# Patient Record
Sex: MALE | Race: BLACK OR AFRICAN AMERICAN | NOT HISPANIC OR LATINO | Employment: UNEMPLOYED | URBAN - METROPOLITAN AREA
[De-identification: names, ages, dates, MRNs, and addresses within clinical notes are randomized per-mention and may not be internally consistent; named-entity substitution may affect disease eponyms.]

---

## 2017-06-22 ENCOUNTER — HISTORICAL (OUTPATIENT)
Dept: ADMINISTRATIVE | Facility: HOSPITAL | Age: 57
End: 2017-06-22

## 2017-07-07 ENCOUNTER — HISTORICAL (OUTPATIENT)
Dept: RADIOLOGY | Facility: HOSPITAL | Age: 57
End: 2017-07-07

## 2017-07-07 LAB
CHOLEST SERPL-MCNC: 192 MG/DL
CHOLEST/HDLC SERPL: 2.9 {RATIO} (ref 0–5)
EST. AVERAGE GLUCOSE BLD GHB EST-MCNC: 123 MG/DL
HAV IGM SERPL QL IA: NONREACTIVE
HBA1C MFR BLD: 5.9 % (ref 4.2–6.3)
HBV CORE IGM SERPL QL IA: NONREACTIVE
HBV SURFACE AG SERPL QL IA: NEGATIVE
HCV AB SERPL QL IA: REACTIVE
HDLC SERPL-MCNC: 66 MG/DL
HIV 1+2 AB+HIV1 P24 AG SERPL QL IA: NONREACTIVE
LDLC SERPL CALC-MCNC: 106 MG/DL (ref 0–130)
PSA SERPL-MCNC: 0.2 NG/ML
TRIGL SERPL-MCNC: 101 MG/DL
TSH SERPL-ACNC: 0.58 MIU/L (ref 0.36–3.74)
VLDLC SERPL CALC-MCNC: 20 MG/DL

## 2017-07-09 LAB
COLOR STL: NORMAL
CONSISTENCY STL: NORMAL
HEMOCCULT SP1 STL QL: NEGATIVE

## 2017-07-10 LAB
COLOR STL: NORMAL
CONSISTENCY STL: NORMAL
HEMOCCULT SP2 STL QL: NEGATIVE

## 2017-07-11 LAB
COLOR STL: NORMAL
CONSISTENCY STL: NORMAL

## 2017-07-20 ENCOUNTER — HISTORICAL (OUTPATIENT)
Dept: ADMINISTRATIVE | Facility: HOSPITAL | Age: 57
End: 2017-07-20

## 2017-07-20 LAB
BUN SERPL-MCNC: 13 MG/DL (ref 7–18)
CALCIUM SERPL-MCNC: 9.3 MG/DL (ref 8.5–10.1)
CHLORIDE SERPL-SCNC: 105 MMOL/L (ref 98–107)
CO2 SERPL-SCNC: 29 MMOL/L (ref 21–32)
CREAT SERPL-MCNC: 0.9 MG/DL (ref 0.6–1.3)
ERYTHROCYTE [DISTWIDTH] IN BLOOD BY AUTOMATED COUNT: 12.8 % (ref 11.5–14.5)
GLUCOSE SERPL-MCNC: 111 MG/DL (ref 74–106)
HCT VFR BLD AUTO: 44.5 % (ref 40–51)
HGB BLD-MCNC: 15 GM/DL (ref 13.5–17.5)
INR PPP: 0.93 (ref 0.9–1.2)
MCH RBC QN AUTO: 30.7 PG (ref 26–34)
MCHC RBC AUTO-ENTMCNC: 33.7 GM/DL (ref 31–37)
MCV RBC AUTO: 91.2 FL (ref 80–100)
PLATELET # BLD AUTO: 220 X10(3)/MCL (ref 130–400)
PMV BLD AUTO: 10.8 FL (ref 7.4–10.4)
POTASSIUM SERPL-SCNC: 4.1 MMOL/L (ref 3.5–5.1)
PROTHROMBIN TIME: 12.3 SECOND(S) (ref 11.9–14.4)
RBC # BLD AUTO: 4.88 X10(6)/MCL (ref 4.5–5.9)
RPR SER QL: NON REACTIVE
SODIUM SERPL-SCNC: 143 MMOL/L (ref 136–145)
WBC # SPEC AUTO: 8.4 X10(3)/MCL (ref 4.5–11)

## 2017-07-31 ENCOUNTER — HISTORICAL (OUTPATIENT)
Dept: CARDIOLOGY | Facility: HOSPITAL | Age: 57
End: 2017-07-31

## 2017-08-15 ENCOUNTER — HISTORICAL (OUTPATIENT)
Dept: RADIOLOGY | Facility: HOSPITAL | Age: 57
End: 2017-08-15

## 2017-08-30 ENCOUNTER — HISTORICAL (OUTPATIENT)
Dept: CARDIOLOGY | Facility: CLINIC | Age: 57
End: 2017-08-30

## 2017-08-30 LAB
APTT PPP: 28.3 SECOND(S) (ref 23.3–37)
BUN SERPL-MCNC: 14 MG/DL (ref 7–18)
CALCIUM SERPL-MCNC: 9.2 MG/DL (ref 8.5–10.1)
CHLORIDE SERPL-SCNC: 106 MMOL/L (ref 98–107)
CO2 SERPL-SCNC: 25 MMOL/L (ref 21–32)
CREAT SERPL-MCNC: 1 MG/DL (ref 0.6–1.3)
ERYTHROCYTE [DISTWIDTH] IN BLOOD BY AUTOMATED COUNT: 12.8 % (ref 11.5–14.5)
GLUCOSE SERPL-MCNC: 229 MG/DL (ref 74–106)
HCT VFR BLD AUTO: 46.6 % (ref 40–51)
HGB BLD-MCNC: 15.8 GM/DL (ref 13.5–17.5)
INR PPP: 0.84 (ref 0.9–1.2)
MCH RBC QN AUTO: 30.8 PG (ref 26–34)
MCHC RBC AUTO-ENTMCNC: 33.9 GM/DL (ref 31–37)
MCV RBC AUTO: 90.8 FL (ref 80–100)
PLATELET # BLD AUTO: 219 X10(3)/MCL (ref 130–400)
PMV BLD AUTO: 10.2 FL (ref 7.4–10.4)
POTASSIUM SERPL-SCNC: 4.7 MMOL/L (ref 3.5–5.1)
PROTHROMBIN TIME: 11.3 SECOND(S) (ref 11.9–14.4)
RBC # BLD AUTO: 5.13 X10(6)/MCL (ref 4.5–5.9)
SODIUM SERPL-SCNC: 139 MMOL/L (ref 136–145)
WBC # SPEC AUTO: 7.4 X10(3)/MCL (ref 4.5–11)

## 2017-09-06 ENCOUNTER — HISTORICAL (OUTPATIENT)
Dept: CARDIOLOGY | Facility: HOSPITAL | Age: 57
End: 2017-09-06

## 2018-06-11 ENCOUNTER — HISTORICAL (OUTPATIENT)
Dept: INTERNAL MEDICINE | Facility: CLINIC | Age: 58
End: 2018-06-11

## 2018-06-29 ENCOUNTER — HISTORICAL (OUTPATIENT)
Dept: ADMINISTRATIVE | Facility: HOSPITAL | Age: 58
End: 2018-06-29

## 2018-07-25 ENCOUNTER — HISTORICAL (OUTPATIENT)
Dept: RADIOLOGY | Facility: HOSPITAL | Age: 58
End: 2018-07-25

## 2018-10-11 ENCOUNTER — HISTORICAL (OUTPATIENT)
Dept: ADMINISTRATIVE | Facility: HOSPITAL | Age: 58
End: 2018-10-11

## 2018-11-08 ENCOUNTER — HISTORICAL (OUTPATIENT)
Dept: ADMINISTRATIVE | Facility: HOSPITAL | Age: 58
End: 2018-11-08

## 2018-12-06 ENCOUNTER — HISTORICAL (OUTPATIENT)
Dept: LAB | Facility: HOSPITAL | Age: 58
End: 2018-12-06

## 2019-01-08 ENCOUNTER — HOSPITAL ENCOUNTER (OUTPATIENT)
Dept: MEDSURG UNIT | Facility: HOSPITAL | Age: 59
End: 2019-01-09
Attending: INTERNAL MEDICINE | Admitting: INTERNAL MEDICINE

## 2019-02-06 ENCOUNTER — HISTORICAL (OUTPATIENT)
Dept: ADMINISTRATIVE | Facility: HOSPITAL | Age: 59
End: 2019-02-06

## 2019-02-08 LAB — FINAL CULTURE: NO GROWTH

## 2019-02-14 ENCOUNTER — HISTORICAL (OUTPATIENT)
Dept: RADIOLOGY | Facility: HOSPITAL | Age: 59
End: 2019-02-14

## 2019-07-30 ENCOUNTER — HISTORICAL (OUTPATIENT)
Dept: INTERNAL MEDICINE | Facility: CLINIC | Age: 59
End: 2019-07-30

## 2019-08-22 ENCOUNTER — HISTORICAL (OUTPATIENT)
Dept: INTERNAL MEDICINE | Facility: CLINIC | Age: 59
End: 2019-08-22

## 2019-10-01 ENCOUNTER — HISTORICAL (OUTPATIENT)
Dept: RADIOLOGY | Facility: HOSPITAL | Age: 59
End: 2019-10-01

## 2019-10-23 ENCOUNTER — HISTORICAL (OUTPATIENT)
Dept: RADIOLOGY | Facility: HOSPITAL | Age: 59
End: 2019-10-23

## 2020-01-29 ENCOUNTER — HISTORICAL (OUTPATIENT)
Dept: INTERNAL MEDICINE | Facility: CLINIC | Age: 60
End: 2020-01-29

## 2020-05-28 ENCOUNTER — HISTORICAL (OUTPATIENT)
Dept: CARDIOLOGY | Facility: HOSPITAL | Age: 60
End: 2020-05-28

## 2021-12-03 ENCOUNTER — HISTORICAL (OUTPATIENT)
Dept: RADIOLOGY | Facility: HOSPITAL | Age: 61
End: 2021-12-03

## 2022-04-09 ENCOUNTER — HISTORICAL (OUTPATIENT)
Dept: ADMINISTRATIVE | Facility: HOSPITAL | Age: 62
End: 2022-04-09

## 2022-04-25 VITALS
DIASTOLIC BLOOD PRESSURE: 79 MMHG | HEIGHT: 68 IN | SYSTOLIC BLOOD PRESSURE: 129 MMHG | OXYGEN SATURATION: 99 % | WEIGHT: 162.69 LBS | BODY MASS INDEX: 24.66 KG/M2

## 2022-04-30 NOTE — DISCHARGE SUMMARY
Patient:   Palmer Quinones             MRN: [id number]            FIN: [id number]               Age:   58 years     Sex:  Male     :  1960   Associated Diagnoses:   Angina pectoris, unstable; HTN (hypertension)   Author:   Santi Ngo MD      Discharge Information      Discharge Summary Information   Admitted  2019   Discharged  2019   Admitting physician     Sarah Tate MD     Referring physician for admission     Gaurav Mcgovern MD     Discharge diagnosis     Angina pectoris, unstable ([id number] I20.0).     HTN (hypertension) ([id number] I10).     Discharge medications     OTHER MEDICATIONS (Selected)   Prescriptions  Prescribed  aspirin 81 mg oral tablet: 81 mg = 1 tab(s), Oral, Daily, # 90 tab(s), 1 Refill(s)  atorvastatin 20 mg oral tablet: 20 mg = 1 tab(s), Oral, Daily, # 30 tab(s), 3 Refill(s)  metoprolol tartrate 25 mg oral tab: 12.5 mg = 0.5 tab(s), Oral, BID, # 30 tab(s), 3 Refill(s).        Hospital Course   Mr. 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  He stated that the pain fluctuated in intensity with no radiation at rest and has not noticed it worsening on exertion.  Nothing makes better or worse.  Last episode in the morning of presentation to ED, but has resolved on exam in ED.  Patient admitted acid reflux symptoms, but does not take PPI.  He stated that he is unsure of whether these chest pains feel the same.  He is also unsure of whether the pain is associated with meals.  Patient denies any palpitations, shortness of breath, dizziness, loss of consciousness, fatigue, and weakness.  Patient was last seen in Cardiology clinic on 10/11/2018 and was asymptomatic at the time, but recommended repeat TTE in 1 year and continue to follow-up with Cardiology.  Duration of hospital stay was one night.  The patient  remained asymptomatic with no complaints of chest pain, palpitations, or any concerning symptoms since admission last night.  Patient was eating and ambulating without any issues.  This morning, patient was seen ambulating, drinking, coffee, and happily conversing with the nurse.  Patient even ambulated downstairs without respiratory distress, dizziness chest pain.  Patient received a TTE which showed LVEF of 65% and indications of grade 1 diastolic dysfunction with moderate MR, mild AR, and mild TR.  The planned cardiac stress test this morning was delayed due to the patient eating this morning.  However, due to patient asymptomatic, the cardiac stress test can be rescheduled as outpatient.  Patient was hemodynamically stable and without concerning complaints.  Patient was discharged home on cardiac diet and on self-care.  Because patient has a new right bundle branch block found on EKG, he was instructed to follow-up with Cardiology within 1 week.  He is to also keep his appointment for a dobutamine stress echo and follow up with his PCP for post-courtney visit.  He was resumed on home medications including aspirin, metoprolol tartrate, and a statin.  Due to the patient's increased 10 year ASCVD risk, he was started on a atorvastatin.  Discharge plans were discussed with the patient.  Patient stated understanding and agreed with the plan.      Discharge Plan   Discharge Summary Plan   Discharge Status: improved.     Discharge instructions given: to patient, to family member spouse.     Discharge disposition: discharge to home (into the care of family member, self care).     Prescriptions: continue same medications, reviewed (with patient, with spouse), written and given to patient.     Course   Improving.     Education and Follow-up   Counseled: patient, family, regarding diagnosis, regarding treatment, regarding medications.     Discharge Planning: Exercise Stress Echocardiogram, Angina Pectoris, Easy-to-Read, Follow  up with PCP for post-courtney visit within 2 weeks; Follow up with Cardiology within 1 week; Anytime the conditions worsen, return to clinic or go to ED; Keep appointment for cardiac stress test.

## 2022-04-30 NOTE — ED PROVIDER NOTES
Patient:   Palmer Quinones             MRN: [id number]            FIN: [id number]               Age:   58 years     Sex:  Male     :  1960   Associated Diagnoses:   Chest pain   Author:   Gaurav Mcgovern MD      Basic Information   Time seen: Date & time 2019 15:53:00.   History source: Patient.   Arrival mode: Private vehicle.   History limitation: None.   Additional information: Chief Complaint from Nursing Triage Note : Chief Complaint   2019 15:33 CST       Chief Complaint           c/o midsternal chest pain at intervals. also c/o left foot pain. 2nd toe swelling noted. hx of open heart surgery  .      History of Present Illness   The patient presents with right, toe pain.  The onset was chronic.  The course/duration of symptoms is constant.  Type of injury: none.  The character of symptoms is pain and swelling.  The degree at present is none.  There are exacerbating factors including movement and transfer.  The relieving factor is none.  Risk factors consist of age.  Prior episodes: none.  Therapy today: none.  Associated symptoms: none.     The patient presents with chest pain.  The onset was 1  weeks ago.  The course/duration of symptoms is fluctuating in intensity.  Location: substernal. Radiating pain: none. The character of symptoms is tightness.  The degree at onset was moderate.  The degree at maximum was severe.  The degree at present is none.  The exacerbating factor is exertion.  The relieving factor is rest.  Risk factors consist of hypertension and hx of left atrial myxoma.  Prior episodes: myxoma.  Therapy today None.  Associated symptoms: none.        Review of Systems   Constitutional symptoms:  No fever, no chills, no sweats, no weakness, no fatigue.    Skin symptoms:  No rash,    Eye symptoms:  No recent vision problems,    ENMT symptoms:  No sore throat,    Respiratory symptoms:  Negative except as documented in HPI.   Cardiovascular symptoms:  Negative except as  documented in HPI.   Gastrointestinal symptoms:  No abdominal pain, no nausea, no vomiting, no diarrhea, no constipation.    Genitourinary symptoms:  No dysuria,    Musculoskeletal symptoms:  No back pain, no Muscle pain.    Neurologic symptoms:  No headache, no dizziness.              Additional review of systems information: All other systems reviewed and otherwise negative.      Health Status   Allergies:    Allergic Reactions (Selected)  No Known Medication Allergies.   Medications:  (Selected)   Prescriptions  Prescribed  aspirin 81 mg oral tablet: 81 mg = 1 tab(s), Oral, Daily, # 90 tab(s), 0 Refill(s), other reason (Rx)  losartan 25 mg oral tablet: 25 mg = 1 tab(s), Oral, Daily, # 30 tab(s), 11 Refill(s), Pharmacy: [id number]  metoprolol tartrate 25 mg oral tab: 12.5 mg = 0.5 tab(s), Oral, BID, # 30 tab(s), 11 Refill(s), Pharmacy: [id number].      Past Medical/ Family/ Social History   Surgical history:    left atrial myxoma resection on 10/5/2017 at 57 Years.  Transesophageal Echo (for [facility]) (None) on 7/31/2017 at 57 Years.  Comments:  7/31/2017 09:07 - Dakotah BLANCO, Mami WAHL  auto-populated from documented surgical case  denies.  open heart surgery..   Family history:    Heart attack  Mother  Hypertension.  Father  Diabetes mellitus type 1.  Father  .      Physical Examination   General:  Alert, no acute distress.                Vital Signs   Skin:  Warm, dry.    Head:  Normocephalic.   Neck:  Supple, trachea midline, no tenderness, no JVD.    Eye:  Pupils are equal, round and reactive to light, extraocular movements are intact.    Ears, nose, mouth and throat:  Oral mucosa moist, no pharyngeal erythema or exudate.    Cardiovascular:  Regular rate and rhythm, No murmur, Normal peripheral perfusion, No edema.    Respiratory:  Lungs are clear to auscultation, respirations are non-labored, breath sounds are equal, Symmetrical chest wall expansion.    Chest wall:  No tenderness.   Back:   Nontender, Normal range of motion, Normal alignment, no step-offs.    Musculoskeletal:  Normal ROM, normal strength, no tenderness, no swelling, no deformity.    Gastrointestinal:  Soft, Nontender, Non distended, Normal bowel sounds.    Neurological:  Alert and oriented to person, place, time, and situation, No focal neurological deficit observed, CN II-XII intact, normal sensory observed, normal motor observed, normal speech observed, normal coordination observed.       Medical Decision Making   Documents reviewed:  Emergency department nurses' notes, emergency department records, prior records.    Electrocardiogram:  Time 1/8/2019 15:31:00, rate 88, normal sinus rhythm, No ST-T changes, EP Interp, QRS interval Left ventricular hypertrophy, right bundle branch block.    Results review:  Lab results : Lab View   1/8/2019 16:04 CST       Sodium Lvl                140 mmol/L                             Potassium Lvl             3.7 mmol/L                             Chloride                  106 mmol/L                             CO2                       28 mmol/L                             Calcium Lvl               8.5 mg/dL                             Glucose Lvl               176 mg/dL  HI                             BUN                       16 mg/dL                             Creatinine                1.00 mg/dL                             eGFR-AA                   99 mL/min                             eGFR-JAZMYN                  82 mL/min  LOW                             Bili Total                0.3 mg/dL                             Bili Direct               0.1 mg/dL                             Bili Indirect             0.2 mg/dL                             AST                       23 unit/L                             ALT                       22 unit/L                             Alk Phos                  63 unit/L                             Total Protein             7.6 gm/dL                              Albumin Lvl               3.7 gm/dL                             Globulin                  3.90 gm/mL  HI                             A/G Ratio                 1 ratio                             NT pro BNP.               62 pg/mL                             Total CK                  350 unit/L  HI                             CK MB                     2.7 ng/mL                             Troponin-I                <0.015 ng/mL                             PT                        14.1 second(s)                             INR                       1.10                             PTT                       28.0 second(s)                             WBC                       8.5 x10(3)/mcL                             RBC                       4.30 x10(6)/mcL  LOW                             Hgb                       13.1 gm/dL  LOW                             Hct                       38.6 %  LOW                             Platelet                  201 x10(3)/mcL                             MCV                       89.8 fL                             MCH                       30.5 pg                             MCHC                      33.9 gm/dL                             RDW                       12.7 %                             MPV                       9.3 fL                             Abs Neut                  5.50 x10(3)/mcL                             Neutro Auto               65 x10(3)/mcL  NA                             Lymph Auto                26 %                             Mono Auto                 7 %                             Eos Auto                  1  NA                             Abs Eos                   0.08 x10(3)/mcL  NA                             Basophil Auto             0  NA                             Abs Neutro                5.50 x10(3)/mcL  NA                             Abs Lymph                 2.20 x10(3)/mcL  NA                             Abs Mono                  0.63  x10(3)/mcL  NA                             Abs Baso                  0.03 x10(3)/mcL  NA                             IG%                       0 %  NA                             IG#                       0.0200  NA  .   Radiology results:  Rad Results (ST)  < 12 hrs   Accession: [id number]  Order: XR Chest 2 Views  Report Dt/Tm: 01/08/2019 16:42  Report:   Chest 2 views     Clinical history is chest pain     This is compared to a previous study from 5/24/2017     Lungs are clear. Heart size is within normal limits. The costophrenic  angles are clear.     IMPRESSION: No acute disease is seen.    .      Reexamination/ Reevaluation   Vital signs   results included from flowsheet : Vital Signs   1/8/2019 15:33 CST       Temperature Oral          37 DegC                             Temperature Oral (calculated)             98.60 DegF                             Peripheral Pulse Rate     90 bpm                             Respiratory Rate          20 br/min                             SpO2                      98 %                             Oxygen Therapy            Room air                             Systolic Blood Pressure   142 mmHg  HI                             Diastolic Blood Pressure  90 mmHg        Impression and Plan   Diagnosis   Chest pain (ERN58-OL R07.9)      Calls-Consults   -  1/8/2019 17:36:00 , Shaikh HATTIE, SANJANA Montano on call, consult.    Plan   Condition: Guarded.    Disposition: Admit time  1/8/2019 16:57:00, Place in Observation Telemetry Unit.

## 2022-05-02 NOTE — HISTORICAL [facility] CERNER
This is a historical note converted from [employer]. Formatting and pictures may have been removed.  Please reference [employer] for original formatting and attached multimedia. Chief Complaint  ER F/U- states he has chest and stomach pain off and on headaches dry mouth rt leg pain  History of Present Illness  56 y/o male here for initial visit. Pt was seen in ER 5-24-17 for CP and had abnl EKG. Pt states he get CP off and on approx 3 X per week to mid right upper CW. Denies SOB. Mom passed with massive MI, Dad has hx CAD. Pt states his blood sugar was elevated in ER and has fmly hx of DM in mom. Pt states he also has constipation off and on at times. Pt states he has been having right leg pain at knee area X 2 months. Denies injury to knee.  Review of Systems  All negative except: See HPI  Physical Exam  Vitals & Measurements  T:?36.7? ?C ?(Oral)? HR:?55?(Peripheral)? BP:?135/91? HT:?170?cm? HT:?170?cm? WT:?69?kg? WT:?69?kg? BMI:?23.88?  General: Alert and oriented, No acute distress.  Eye: Pupils are equal, round and reactive to light, Extraocular movements are intact.  HENT: Normocephalic.  Neck: Supple, Non-tender, No carotid bruit, No lymphadenopathy.  Respiratory: Lungs are clear to auscultation, Respirations are non-labored, Breath sounds are equal, Symmetrical chest wall expansion.  Cardiovascular: Normal rate, Regular rhythm, No murmur.  Gastrointestinal: Soft, Non-tender, Non-distended, Normal bowel sounds.  Musculoskeletal: Normal range of motion. No swelling or redness noted to right knee. + grinding noted with AROM. No TTP.  Integumentary: Warm, Dry, Intact.  Neurologic: No focal deficits, Cranial Nerves II-XII are grossly intact.  Assessment/Plan  Chest pain  ?Denies at present. Pt had Abnl EKG in ER on 5-24-17. Refer to Cardio cl for eval and mgmt. Encouraged to start taking ASA 81 mg 1 tab po daily.  Ordered:  Clinic Follow up, *Est. 09/25/17 8:40:00 CDT, in 3 months with JOSUE Sarabia, Order for future visit,  Elevated blood sugar, [facility]  Echocardiogram Stress, *Est. 07/06/17 3:00:00 CDT, Routine, Cardiovascular DZ-Unspecified, *Est. Stop date 07/06/17 3:00:00 CDT, Ambulatory, [facility], Order for future visit, Chest pain  Family history of MI (myocardial infarction)  Internal Referral to Cardiology ([address]), CP, abnl EKG, fmly history of MI, *Est. 07/13/17 3:00:00 CDT, Future Visit?, Chest pain  Family history of MI (myocardial infarction)  Lipid Panel, Routine collect, *Est. 07/06/17 3:00:00 CDT, Blood, Order for future visit, *Est. Stop date 07/06/17 3:00:00 CDT, Lab Collect, Chest pain  Well adult exam, 2, week(s), In Approximately, 06/22/17 10:36:00 CDT  Office/Outpatient Visit Level 4 Established [id number] PC, Chest pain  Constipation  Elevated blood sugar  Well adult exam  Family history of MI (myocardial infarction), [facility], 06/22/17 10:53:00 CDT  ?  Constipation  ?Encouraged to drink plenty of water, encouraged high fiber diet. XR abd flat and erect today.  Ordered:  Clinic Follow up, *Est. 09/25/17 8:40:00 CDT, in 3 months with JOSUE Sarabia, Order for future visit, Elevated blood sugar, [facility]  Office/Outpatient Visit Level 4 Established [id number] PC, Chest pain  Constipation  Elevated blood sugar  Well adult exam  Family history of MI (myocardial infarction), [facility], 06/22/17 10:53:00 CDT  XR Abdomen Flat and Erect, Routine, *Est. 06/22/17 3:00:00 CDT, Constipation, None, Ambulatory, Rad Type, Order for future visit, Constipation, On Exactly, *Est. 06/22/17 3:00:00 CDT  ?  Elevated blood sugar  ?Encouraged ADA diet and exercise. A1c in 2 weeks.  Ordered:  Clinic Follow up, *Est. 09/25/17 8:40:00 CDT, in 3 months with JOSUE Sarabia, Order for future visit, Elevated blood sugar, [facility]  Hemoglobin A1C [facility], Routine collect, *Est. 07/06/17 3:00:00 CDT, Blood, Order for future visit, *Est. Stop date 07/06/17 3:00:00 CDT, Lab Collect, Elevated blood sugar, 2,  week(s), In Approximately, 06/22/17 10:36:00 CDT  Office/Outpatient Visit Level 4 Established [id number] PC, Chest pain  Constipation  Elevated blood sugar  Well adult exam  Family history of MI (myocardial infarction), [facility], 06/22/17 10:53:00 CDT  ?  Family history of MI (myocardial infarction)  ?Stress Echo in 2 weeks. Refer to Cardio cl for eval and mgmt.  Ordered:  Echocardiogram Stress, *Est. 07/06/17 3:00:00 CDT, Routine, Cardiovascular DZ-Unspecified, *Est. Stop date 07/06/17 3:00:00 CDT, Ambulatory, [facility], Order for future visit, Chest pain  Family history of MI (myocardial infarction)  Internal Referral to Cardiology ([address]), CP, abnl EKG, fmly history of MI, *Est. 07/13/17 3:00:00 CDT, Future Visit?, Chest pain  Family history of MI (myocardial infarction)  Office/Outpatient Visit Level 4 Established [id number] PC, Chest pain  Constipation  Elevated blood sugar  Well adult exam  Family history of MI (myocardial infarction), [facility], 06/22/17 10:53:00 CDT  ?  Right knee pain  ?XR right knee today. cont Aleve OTC as prescribed per package prn?pain.  Ordered:  XR Knee Right 4 or More Views, Routine, *Est. 06/22/17 3:00:00 CDT, Pain, None, Ambulatory, Rad Type, Order for future visit, Right knee pain, On Exactly, *Est. 06/22/17 3:00:00 CDT  ?  Well adult exam  ?Labs in 2 weeks. FOBT X 3 NA. pt refused tetanus vaccine.  Ordered:  Clinic Follow up, *Est. 09/25/17 8:40:00 CDT, in 3 months with JOSUE Sarabia, Order for future visit, Elevated blood sugar, [facility]  Hepatitis Panel [facility], Routine collect, *Est. 07/06/17 3:00:00 CDT, Blood, Order for future visit, *Est. Stop date 07/06/17 3:00:00 CDT, Lab Collect, Well adult exam, 2, week(s), In Approximately, 06/22/17 10:51:00 CDT  HIV 1 and 2, Routine collect, *Est. 07/06/17 3:00:00 CDT, Blood, Order for future visit, *Est. Stop date 07/06/17 3:00:00 CDT, Lab Collect, Well adult exam, 2, week(s), In Approximately,  06/22/17 10:51:00 CDT  Lipid Panel, Routine collect, *Est. 07/06/17 3:00:00 CDT, Blood, Order for future visit, *Est. Stop date 07/06/17 3:00:00 CDT, Lab Collect, Chest pain  Well adult exam, 2, week(s), In Approximately, 06/22/17 10:36:00 CDT  Occult Blood Stool #2 Screening, Routine collect, *Est. 07/22/17 3:00:00 CDT, Stool, Order for future visit, *Est. Stop date 07/22/17 3:00:00 CDT, Nurse collect, Well adult exam, day(s), 1, month(s), In Approximately, 06/22/17 10:37:00 CDT  Occult Blood Stool #3 Screening, Routine collect, *Est. 07/22/17 3:00:00 CDT, Stool, Order for future visit, *Est. Stop date 07/22/17 3:00:00 CDT, Nurse collect, Well adult exam, day(s), 1, month(s), In Approximately, 06/22/17 10:37:00 CDT  Occult Blood Stool Screening Test, Routine collect, *Est. 07/22/17 3:00:00 CDT, Stool, Order for future visit, *Est. Stop date 07/22/17 3:00:00 CDT, Nurse collect, Well adult exam, day(s), 1, month(s), In Approximately, 06/22/17 10:37:00 CDT  Office/Outpatient Visit Level 4 Established [id number] PC, Chest pain  Constipation  Elevated blood sugar  Well adult exam  Family history of MI (myocardial infarction), [facility], 06/22/17 10:53:00 CDT  Prostatic Specific Antigen Screening Test, Routine collect, *Est. 07/06/17 3:00:00 CDT, Blood, Order for future visit, *Est. Stop date 07/06/17 3:00:00 CDT, Lab Collect, Well adult exam, 2, week(s), In Approximately, 06/22/17 10:37:00 CDT  Thyroid Stimulating Hormone, Routine collect, *Est. 07/06/17 3:00:00 CDT, Blood, Order for future visit, *Est. Stop date 07/06/17 3:00:00 CDT, Lab Collect, Well adult exam, 2, week(s), In Approximately, 06/22/17 10:36:00 CDT  ?  Orders:  aspirin, 81 mg = 1 tab(s), Oral, Daily, # 90 tab(s), 0 Refill(s), other reason (Rx)  RTC in 3 months.   Problem List/Past Medical History  Acute Hyperglycemia  Chronic stable angina  Historical  No historical problems  Procedure/Surgical History  denies.  Medications  No active  medications  Allergies  No Known Medication Allergies  Social History  Alcohol  Current, 1-2 times per month  Employment/School  Employed  Exercise  Exercise type: no.  Home/Environment  Lives with Significant other. Alcohol abuse in household: No. Substance abuse in household: No. Smoker in household: No. Safe place to go: Yes.  Nutrition/Health  Regular  Substance Abuse  Current, Marijuana, Daily  Tobacco  Light tobacco smoker  Family History  Diabetes mellitus type 1.: Father.  Heart attack: Mother.  Hypertension.: Father.

## 2022-05-02 NOTE — HISTORICAL [facility]ER
This is a historical note converted from [employer]. Formatting and pictures may have been removed.  Please reference [employer] for original formatting and attached multimedia. Chief Complaint  follow up  History of Present Illness  59 y/o male here for f/u. pt has hx? CP, Constipation, Hyperglycemia, Fmly hx MI, Right knee pain. Pt followed in Cardio cl [1] Pt states he had a cyst removed from his heart on 10-5-17 in [facility]. [1] Pt c/o of difficulty with erection and noticed blood in his semen approx 1 month. Denies seeing blood in urine. Denies pain or burning with urination.? Denies recent injury to genital area. Denies swelling of genitals. States does have trouble with urinary stream- states starts slow before good stream.  Review of Systems  Constitutional: negative except as stated in HPI  Eye: negative except as stated in HPI  ENT: negative except as stated in HPI  Respiratory: negative except as stated in HPI  Cardiovascular: negative except as stated in HPI  Gastrointestinal: negative except as stated in HPI  Genitourinary: negative except as stated in HPI  Heme/Lymph: negative except as stated in HPI  Endocrine: negative except as stated in HPI  Immunologic: negative except as stated in HPI  Musculoskeletal: negative except as stated in HPI  Integumentary: negative except as stated in HPI  Neurologic: negative except as stated in HPI  ?   All Other ROS_ negative except as stated in HPI  ?  Physical Exam  Vitals & Measurements  T:?36.8? ?C (Oral)? HR:?67(Peripheral)? RR:?18? BP:?138/80?  HT:?172?cm? WT:?73?kg? BMI:?24.68?  General: Alert and oriented, No acute distress.  Eye: Pupils are equal, round and reactive to light, Extraocular movements are intact.  HENT: Normocephalic.  Neck: Supple, Non-tender, No carotid bruit, No lymphadenopathy.  Respiratory: Lungs are clear to auscultation, Respirations are non-labored, Breath sounds are equal, Symmetrical chest wall expansion.  Cardiovascular: Normal rate, Regular  rhythm, No murmur.  Gastrointestinal: Soft, Non-tender, Non-distended, Normal bowel sounds.  Musculoskeletal: Normal range of motion. + swelling noted to right 3rd and 4th PIP joints. No redness or warmth noted.  Integumentary: Warm, Dry, Intact.  Neurologic: No focal deficits, Cranial Nerves II-XII are grossly intact.  Assessment/Plan  Chest pain?R07.9  ?Denies at present. Pt followed in Cardio cl. Keep appts. [2]  Ordered:  1160F- Medication reconciliation completed during visit, Chest pain  Constipation  Family history of MI (myocardial infarction)  Hepatitis C  HTN (hypertension)  Hyperglycemia  Insomnia  Rash  Right knee pain  Well adult exam  Hematospermia  ED (erectile dysfunction)  Prediabetes  Joint pain, U...  Clinic Follow up, *Est. 08/06/19 3:00:00 CDT, in 6 months with JOSUE Sarabia, Order for future visit, Constipation  Chest pain  Family history of MI (myocardial infarction)  HTN (hypertension)  Hepatitis C  Hyperglycemia  Insomnia  Rash  Right knee pain  Well ad...  Office/Outpatient Visit Level 4 Established [id number] PC, Chest pain  Constipation  Family history of MI (myocardial infarction)  Hepatitis C  HTN (hypertension)  Hyperglycemia  Insomnia  Rash  Right knee pain  Well adult exam  Hematospermia  ED (erectile dysfunction)  Prediabetes  Joint pain, U...  ?  Constipation?K59.00  ??Encouraged to drink plenty of water, encouraged high fiber diet. [3]  Ordered:  1160F- Medication reconciliation completed during visit, Chest pain  Constipation  Family history of MI (myocardial infarction)  Hepatitis C  HTN (hypertension)  Hyperglycemia  Insomnia  Rash  Right knee pain  Well adult exam  Hematospermia  ED (erectile dysfunction)  Prediabetes  Joint pain, U...  Clinic Follow up, *Est. 08/06/19 3:00:00 CDT, in 6 months with JOSUE Sarabia, Order for future visit, Constipation  Chest pain  Family history of MI (myocardial infarction)  HTN (hypertension)   Hepatitis C  Hyperglycemia  Insomnia  Rash  Right knee pain  Well ad...  Office/Outpatient Visit Level 4 Established [id number] PC, Chest pain  Constipation  Family history of MI (myocardial infarction)  Hepatitis C  HTN (hypertension)  Hyperglycemia  Insomnia  Rash  Right knee pain  Well adult exam  Hematospermia  ED (erectile dysfunction)  Prediabetes  Joint pain, U...  ?  ED (erectile dysfunction)?N52.9  ?Labs today. Refer to Uro cl for further eval and mgmt.  Ordered:  1160F- Medication reconciliation completed during visit, Chest pain  Constipation  Family history of MI (myocardial infarction)  Hepatitis C  HTN (hypertension)  Hyperglycemia  Insomnia  Rash  Right knee pain  Well adult exam  Hematospermia  ED (erectile dysfunction)  Prediabetes  Joint pain, U...  Chlamydia trach and N. gonorrhea PCR, Routine collect, Urine, Order for future visit, *Est. 02/06/19 3:00:00 CST, *Est. Stop date 02/06/19 3:00:00 CST, Nurse collect, Hematospermia  ED (erectile dysfunction)  Follicle Stimulating Hormone Level, Routine collect, *Est. 02/06/19 3:00:00 CST, Blood, Order for future visit, *Est. Stop date 02/06/19 3:00:00 CST, Lab Collect, ED (erectile dysfunction), 02/06/19 15:07:00 CST  Herpes Simplex Virus By PCR-[facility], Routine collect, [facility] Review, Order for future visit, *Est. 02/06/19 3:00:00 CST, *Est. Stop date 02/06/19 3:00:00 CST, Nurse collect, Hematospermia  ED (erectile dysfunction), 02/06/19 15:06:00 CST  Internal Referral to Urology, Hematospermia/ ED, *Est. 02/27/19 3:00:00 CST, Future Visit?, Hematospermia  ED (erectile dysfunction)  Luteinizing Hormone Level, Routine collect, *Est. 02/06/19 3:00:00 CST, Blood, Order for future visit, *Est. Stop date 02/06/19 3:00:00 CST, Lab Collect, ED (erectile dysfunction), 02/06/19 15:07:00 CST  Office/Outpatient Visit Level 4 Established [id number] PC, Chest pain  Constipation  Family history of MI (myocardial infarction)  Hepatitis C  HTN  (hypertension)  Hyperglycemia  Insomnia  Rash  Right knee pain  Well adult exam  Hematospermia  ED (erectile dysfunction)  Prediabetes  Joint pain, U...  Pathology Non-Gyn Request [facility], *Est. 02/06/19 3:00:00 CST, Routine, Order for future visit, AP Specimen, urine, Hematospermia, Nurse Collect, Print Label, RT - Routine, [id number], Hold Until Collected, Hematospermia  ED (erectile dysfunction), *Est. 02/06/19 3:00:00 CST  Prostatic Specific Antigen Screening Test, Routine collect, *Est. 02/06/19 3:00:00 CST, Blood, Order for future visit, *Est. Stop date 02/06/19 3:00:00 CST, Lab Collect, Hematospermia  ED (erectile dysfunction), 02/06/19 15:06:00 CST  Syphilis Antibody (with Reflex RPR), Routine collect, *Est. 02/06/19 3:00:00 CST, Blood, Order for future visit, *Est. Stop date 02/06/19 3:00:00 CST, Lab Collect, Hematospermia  ED (erectile dysfunction), 02/06/19 15:06:00 CST  Testosterone Free and Total, Adult Male-[facility] [id number], Routine collect, *Est. 02/06/19 3:00:00 CST, Blood, Order for future visit, *Est. Stop date 02/06/19 3:00:00 CST, Lab Collect, ED (erectile dysfunction), 02/06/19 15:07:00 CST  Urinalysis with Microscopic if Indicated, Routine collect, Urine, Order for future visit, *Est. 02/06/19 3:00:00 CST, *Est. Stop date 02/06/19 3:00:00 CST, Nurse collect, Hematospermia  ED (erectile dysfunction)  Urine Culture [id number], Routine collect, *Est. 02/06/19 3:00:00 CST, Order for future visit, Urine, Nurse collect, *Est. Stop date 02/06/19 3:00:00 CST, Hematospermia  ED (erectile dysfunction)  ?  Family history of MI (myocardial infarction)?Z82.49  ?? Pt followed in Cardio cl. Keep appts. [4]  Ordered:  1160F- Medication reconciliation completed during visit, Chest pain  Constipation  Family history of MI (myocardial infarction)  Hepatitis C  HTN (hypertension)  Hyperglycemia  Insomnia  Rash  Right knee pain  Well adult exam  Hematospermia  ED (erectile dysfunction)  Prediabetes   Joint pain, U...  Clinic Follow up, *Est. 08/06/19 3:00:00 CDT, in 6 months with JOSUE Sarabia, Order for future visit, Constipation  Chest pain  Family history of MI (myocardial infarction)  HTN (hypertension)  Hepatitis C  Hyperglycemia  Insomnia  Rash  Right knee pain  Well ad...  Office/Outpatient Visit Level 4 Established 85090 PC, Chest pain  Constipation  Family history of MI (myocardial infarction)  Hepatitis C  HTN (hypertension)  Hyperglycemia  Insomnia  Rash  Right knee pain  Well adult exam  Hematospermia  ED (erectile dysfunction)  Prediabetes  Joint pain, U...  ?  Hematospermia?R36.1  ?Will RX Cipro 500 mg 1 tab po BID X 14 days. Drink plenty of water. Labs today. Refer to Uro cl for further eval and mgmt.  Ordered:  ciprofloxacin, 500 mg = 1 tab(s), Oral, q12hr, X 14 day(s), # 28 tab(s), 0 Refill(s), Pharmacy: [id number]  tamsulosin, 0.4 mg = 1 cap(s), Oral, Daily, # 30 cap(s), 3 Refill(s), Pharmacy: [facility]  1160F- Medication reconciliation completed during visit, Chest pain  Constipation  Family history of MI (myocardial infarction)  Hepatitis C  HTN (hypertension)  Hyperglycemia  Insomnia  Rash  Right knee pain  Well adult exam  Hematospermia  ED (erectile dysfunction)  Prediabetes  Joint pain, U...  Chlamydia trach and N. gonorrhea PCR, Routine collect, Urine, Order for future visit, *Est. 02/06/19 3:00:00 CST, *Est. Stop date 02/06/19 3:00:00 CST, Nurse collect, Hematospermia  ED (erectile dysfunction)  Herpes Simplex Virus By PCR-[facility], Routine collect, [facility] Review, Order for future visit, *Est. 02/06/19 3:00:00 CST, *Est. Stop date 02/06/19 3:00:00 CST, Nurse collect, Hematospermia  ED (erectile dysfunction), 02/06/19 15:06:00 CST  Internal Referral to Urology, Hematospermia/ ED, *Est. 02/27/19 3:00:00 CST, Future Visit?, Hematospermia  ED (erectile dysfunction)  Office/Outpatient Visit Level 4 Established 51956 PC, Chest pain   Constipation  Family history of MI (myocardial infarction)  Hepatitis C  HTN (hypertension)  Hyperglycemia  Insomnia  Rash  Right knee pain  Well adult exam  Hematospermia  ED (erectile dysfunction)  Prediabetes  Joint pain, U...  Pathology Non-Gyn Request [facility], *Est. 02/06/19 3:00:00 CST, Routine, Order for future visit, AP Specimen, urine, Hematospermia, Nurse Collect, Print Label, RT - Routine, [email or web address], Hold Until Collected, Hematospermia  ED (erectile dysfunction), *Est. 02/06/19 3:00:00 CST  Prostatic Specific Antigen Screening Test, Routine collect, *Est. 02/06/19 3:00:00 CST, Blood, Order for future visit, *Est. Stop date 02/06/19 3:00:00 CST, Lab Collect, Hematospermia  ED (erectile dysfunction), 02/06/19 15:06:00 CST  Syphilis Antibody (with Reflex RPR), Routine collect, *Est. 02/06/19 3:00:00 CST, Blood, Order for future visit, *Est. Stop date 02/06/19 3:00:00 CST, Lab Collect, Hematospermia  ED (erectile dysfunction), 02/06/19 15:06:00 CST  Urinalysis with Microscopic if Indicated, Routine collect, Urine, Order for future visit, *Est. 02/06/19 3:00:00 CST, *Est. Stop date 02/06/19 3:00:00 CST, Nurse collect, Hematospermia  ED (erectile dysfunction)  Urine Culture [id number], Routine collect, *Est. 02/06/19 3:00:00 CST, Order for future visit, Urine, Nurse collect, *Est. Stop date 02/06/19 3:00:00 CST, Hematospermia  ED (erectile dysfunction)  ?  Hepatitis C?B19.20  ?Pt followed in ID cl. Keep appts.  Ordered:  1160F- Medication reconciliation completed during visit, Chest pain  Constipation  Family history of MI (myocardial infarction)  Hepatitis C  HTN (hypertension)  Hyperglycemia  Insomnia  Rash  Right knee pain  Well adult exam  Hematospermia  ED (erectile dysfunction)  Prediabetes  Joint pain, U...  Clinic Follow up, *Est. 08/06/19 3:00:00 CDT, in 6 months with JOSUE Sarabia, Order for future visit, Constipation  Chest pain  Family history of MI (myocardial infarction)  HTN  (hypertension)  Hepatitis C  Hyperglycemia  Insomnia  Rash  Right knee pain  Well ad...  Office/Outpatient Visit Level 4 Established 47309 PC, Chest pain  Constipation  Family history of MI (myocardial infarction)  Hepatitis C  HTN (hypertension)  Hyperglycemia  Insomnia  Rash  Right knee pain  Well adult exam  Hematospermia  ED (erectile dysfunction)  Prediabetes  Joint pain, U...  ?  HTN (hypertension)?I10  ??Low fat low salt diet and exercise. [5]  Ordered:  1160F- Medication reconciliation completed during visit, Chest pain  Constipation  Family history of MI (myocardial infarction)  Hepatitis C  HTN (hypertension)  Hyperglycemia  Insomnia  Rash  Right knee pain  Well adult exam  Hematospermia  ED (erectile dysfunction)  Prediabetes  Joint pain, U...  Clinic Follow up, *Est. 08/06/19 3:00:00 CDT, in 6 months with JOSUE Sarabia, Order for future visit, Constipation  Chest pain  Family history of MI (myocardial infarction)  HTN (hypertension)  Hepatitis C  Hyperglycemia  Insomnia  Rash  Right knee pain  Well ad...  Lipid Panel, Routine collect, *Est. 08/06/19 3:00:00 CDT, Blood, Order for future visit, *Est. Stop date 08/06/19 3:00:00 CDT, Lab Collect, HTN (hypertension), 02/06/19 15:07:00 CST  Office/Outpatient Visit Level 4 Established [id number] PC, Chest pain  Constipation  Family history of MI (myocardial infarction)  Hepatitis C  HTN (hypertension)  Hyperglycemia  Insomnia  Rash  Right knee pain  Well adult exam  Hematospermia  ED (erectile dysfunction)  Prediabetes  Joint pain, U...  ?  Hyperglycemia?R73.9  ?Encouraged ADA diet and exercise. A1c 5.9. [6]  Ordered:  1160F- Medication reconciliation completed during visit, Chest pain  Constipation  Family history of MI (myocardial infarction)  Hepatitis C  HTN (hypertension)  Hyperglycemia  Insomnia  Rash  Right knee pain  Well adult exam  Hematospermia  ED (erectile dysfunction)  Prediabetes  Joint  pain, U...  Clinic Follow up, *Est. 08/06/19 3:00:00 CDT, in 6 months with JOSUE Sarabia, Order for future visit, Constipation  Chest pain  Family history of MI (myocardial infarction)  HTN (hypertension)  Hepatitis C  Hyperglycemia  Insomnia  Rash  Right knee pain  Well ad...  Office/Outpatient Visit Level 4 Established 33411 PC, Chest pain  Constipation  Family history of MI (myocardial infarction)  Hepatitis C  HTN (hypertension)  Hyperglycemia  Insomnia  Rash  Right knee pain  Well adult exam  Hematospermia  ED (erectile dysfunction)  Prediabetes  Joint pain, U...  ?  Insomnia?G47.00  ??Encouraged Melatonin OTC as prescribed per package prn insomnia. [7]  Ordered:  1160F- Medication reconciliation completed during visit, Chest pain  Constipation  Family history of MI (myocardial infarction)  Hepatitis C  HTN (hypertension)  Hyperglycemia  Insomnia  Rash  Right knee pain  Well adult exam  Hematospermia  ED (erectile dysfunction)  Prediabetes  Joint pain, U...  Clinic Follow up, *Est. 08/06/19 3:00:00 CDT, in 6 months with JOSUE Sarabia, Order for future visit, Constipation  Chest pain  Family history of MI (myocardial infarction)  HTN (hypertension)  Hepatitis C  Hyperglycemia  Insomnia  Rash  Right knee pain  Well ad...  Office/Outpatient Visit Level 4 Established 91418 PC, Chest pain  Constipation  Family history of MI (myocardial infarction)  Hepatitis C  HTN (hypertension)  Hyperglycemia  Insomnia  Rash  Right knee pain  Well adult exam  Hematospermia  ED (erectile dysfunction)  Prediabetes  Joint pain, U...  ?  Joint pain?M25.50  ?Auto immune labs today. Cont pain med as prescribed. Pt has nodule noted to right 3rd and 4th digits for a few years.  Ordered:  1160F- Medication reconciliation completed during visit, Chest pain  Constipation  Family history of MI (myocardial infarction)  Hepatitis C  HTN (hypertension)  Hyperglycemia  Insomnia  Rash   Right knee pain  Well adult exam  Hematospermia  ED (erectile dysfunction)  Prediabetes  Joint pain, U...  [id number], Routine collect, 02/06/19 15:18:00 CST, Blood, Order for future visit, Stop date 02/06/19 15:18:00 CST, Lab Collect, Joint pain, 02/06/19 15:18:00 CST  CRP, Routine collect, 02/06/19 15:18:00 CST, Blood, Order for future visit, Stop date 02/06/19 15:18:00 CST, Lab Collect, Joint pain, 02/06/19 15:18:00 CST  Cyclic Citrullinated Peptid IgG [id number], Routine collect, 02/06/19 15:18:00 CST, Blood, Order for future visit, Stop date 02/06/19 15:18:00 CST, Lab Collect, Joint pain, 02/06/19 15:18:00 CST  Office/Outpatient Visit Level 4 Established 00364 PC, Chest pain  Constipation  Family history of MI (myocardial infarction)  Hepatitis C  HTN (hypertension)  Hyperglycemia  Insomnia  Rash  Right knee pain  Well adult exam  Hematospermia  ED (erectile dysfunction)  Prediabetes  Joint pain, U...  Rheumatoid Factor Quantitative, Routine collect, 02/06/19 15:18:00 CST, Blood, Order for future visit, Stop date 02/06/19 15:18:00 CST, Lab Collect, Joint pain, 02/06/19 15:18:00 CST  Sedimentation Rate, Routine collect, 02/06/19 15:18:00 CST, Blood, Order for future visit, Stop date 02/06/19 15:18:00 CST, Lab Collect, Joint pain, 02/06/19 15:18:00 CST  ?  Prediabetes?R73.03  ?A1c 6.1. Encourage ADA diet and exercise. Will repeat A1c in 6 months.  Ordered:  1160F- Medication reconciliation completed during visit, Chest pain  Constipation  Family history of MI (myocardial infarction)  Hepatitis C  HTN (hypertension)  Hyperglycemia  Insomnia  Rash  Right knee pain  Well adult exam  Hematospermia  ED (erectile dysfunction)  Prediabetes  Joint pain, U...  Hemoglobin A1C [facility], Routine collect, *Est. 08/06/19 3:00:00 CDT, Blood, Order for future visit, *Est. Stop date 08/06/19 3:00:00 CDT, Lab Collect, Prediabetes, 02/06/19 15:06:00  CST  Office/Outpatient Visit Level 4 Established [id number] PC, Chest pain  Constipation  Family history of MI (myocardial infarction)  Hepatitis C  HTN (hypertension)  Hyperglycemia  Insomnia  Rash  Right knee pain  Well adult exam  Hematospermia  ED (erectile dysfunction)  Prediabetes  Joint pain, U...  ?  Rash?R21  ?RX Ketoconazole cream 2% apply to affected area daily X 2 weeks prn rash. [8]  Ordered:  1160F- Medication reconciliation completed during visit, Chest pain  Constipation  Family history of MI (myocardial infarction)  Hepatitis C  HTN (hypertension)  Hyperglycemia  Insomnia  Rash  Right knee pain  Well adult exam  Hematospermia  ED (erectile dysfunction)  Prediabetes  Joint pain, U...  Clinic Follow up, *Est. 08/06/19 3:00:00 CDT, in 6 months with JOSUE Sarabia, Order for future visit, Constipation  Chest pain  Family history of MI (myocardial infarction)  HTN (hypertension)  Hepatitis C  Hyperglycemia  Insomnia  Rash  Right knee pain  Well ad...  Office/Outpatient Visit Level 4 Established 13062 PC, Chest pain  Constipation  Family history of MI (myocardial infarction)  Hepatitis C  HTN (hypertension)  Hyperglycemia  Insomnia  Rash  Right knee pain  Well adult exam  Hematospermia  ED (erectile dysfunction)  Prediabetes  Joint pain, U...  ?  Right knee pain?M25.561  ?cont Aleve OTC as prescribed per package prn?pain. [9]  Ordered:  1160F- Medication reconciliation completed during visit, Chest pain  Constipation  Family history of MI (myocardial infarction)  Hepatitis C  HTN (hypertension)  Hyperglycemia  Insomnia  Rash  Right knee pain  Well adult exam  Hematospermia  ED (erectile dysfunction)  Prediabetes  Joint pain, U...  Clinic Follow up, *Est. 08/06/19 3:00:00 CDT, in 6 months with JOSUE Sarabia, Order for future visit, Constipation  Chest pain  Family history of MI (myocardial infarction)  HTN (hypertension)  Hepatitis C  Hyperglycemia   Insomnia  Rash  Right knee pain  Well ad...  Office/Outpatient Visit Level 4 Established 41112 PC, Chest pain  Constipation  Family history of MI (myocardial infarction)  Hepatitis C  HTN (hypertension)  Hyperglycemia  Insomnia  Rash  Right knee pain  Well adult exam  Hematospermia  ED (erectile dysfunction)  Prediabetes  Joint pain, U...  ?  Well adult exam?Z00.00  ?Labs today and in 6 months. PT UTD with FIT.  Ordered:  1160F- Medication reconciliation completed during visit, Chest pain  Constipation  Family history of MI (myocardial infarction)  Hepatitis C  HTN (hypertension)  Hyperglycemia  Insomnia  Rash  Right knee pain  Well adult exam  Hematospermia  ED (erectile dysfunction)  Prediabetes  Joint pain, U...  Clinic Follow up, *Est. 08/06/19 3:00:00 CDT, in 6 months with JOSUE Sarabia, Order for future visit, Constipation  Chest pain  Family history of MI (myocardial infarction)  HTN (hypertension)  Hepatitis C  Hyperglycemia  Insomnia  Rash  Right knee pain  Well ad...  Office/Outpatient Visit Level 4 Established 11429 PC, Chest pain  Constipation  Family history of MI (myocardial infarction)  Hepatitis C  HTN (hypertension)  Hyperglycemia  Insomnia  Rash  Right knee pain  Well adult exam  Hematospermia  ED (erectile dysfunction)  Prediabetes  Joint pain, U...  ?  RTC in 6 months with labs 1 week prior to appt.   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 ?Not  taking  Flomax 0.4 mg oral capsule, 0.4 mg= 1 cap(s), Oral, Daily, 3 refills  LOSARTAN POT TAB 25MG, 25 mg= 1 tab(s), Daily  metoprolol tartrate 25 mg oral tab, 12.5 mg= 0.5 tab(s), Oral, BID, 3 refills  Allergies  No Known Medication Allergies  Social History  Alcohol  Past, 07/20/2017  Employment/School  Employed, Work/School description: works at [employer]. Operates hazardous equipment: No., 05/30/2018  Exercise  Self assessment: Fair condition. Exercise type: no., 10/31/2017  Home/Environment  Lives with Significant other. Living situation: Home/Independent. Alcohol abuse in household: No. Substance abuse in household: No. Smoker in household: No. Injuries/Abuse/Neglect in household: No. Feels unsafe at home: No. Safe place to go: Yes. Family/Friends available for support: Yes. Concern for family members at home: No. Major illness in household: No. Financial concerns: No. TV/Computer concerns: No., 10/31/2017  Nutrition/Health  Regular, Caffeine intake amount: coffee 2 cups/day. Wants to lose weight: No. Sleeping concerns: Yes. Feels highly stressed: No., 05/30/2018  Sexual  Sexually active: Yes. Number of current partners 1., 06/26/2018  Substance Abuse  Current, Marijuana, Daily, 12/05/2015  Tobacco  Former smoker, quit more than 30 days ago, N/A, 01/08/2019  Former smoker, No, 12/18/2018  Family History  Diabetes mellitus type 1.: Father.  Heart attack: Mother.  Hypertension.: Father.  Immunizations  Vaccine Date Status   hepatitis B adult vaccine 12/18/2018 Given   hepatitis B adult vaccine 10/23/2018 Given   influenza virus vaccine, inactivated 10/23/2018 Given   hepatitis B adult vaccine 07/12/2018 Given   Health Maintenance  Health Maintenance  ???Pending?(in the next year)  ??? ??OverDue  ??? ? ? ?Coronary Artery Disease Maintenance-Antiplatelet Agent Prescribed due??and every?  ??? ? ? ?Coronary Artery Disease Maintenance-Lipid Lowering Therapy due??and every?  ??? ? ? ?Diabetes Screening due??and  every?  ??? ? ? ?Alcohol Misuse Screening due??06/22/18??and every 1??year(s)  ??? ??Due?  ??? ? ? ?ADL Screening due??02/06/19??and every 1??year(s)  ??? ??Refused?  ??? ? ? ?Tetanus Vaccine due??02/06/19??and every 10??year(s)  ??? ??Due In Future?  ??? ? ? ?Colorectal Screening not due until??06/07/19??and every 1??year(s)  ??? ? ? ?Blood Pressure Screening not due until??12/18/19??and every 1??year(s)  ??? ? ? ?Depression Screening not due until??12/18/19??and every 1??year(s)  ??? ? ? ?Hypertension Management-Blood Pressure not due until??12/18/19??and every 1??year(s)  ??? ? ? ?Obesity Screening not due until??01/08/20??and every 1??year(s)  ??? ? ? ?Body Mass Index Check not due until??01/09/20??and every 1??year(s)  ??? ? ? ?Hypertension Management-BMP not due until??01/09/20??and every 1??year(s)  ??? ? ? ?Aspirin Therapy for CVD Prevention not due until??01/09/20??and every 1??year(s)  ???Satisfied?(in the past 1 year)  ??? ??Satisfied?  ??? ? ? ?Aspirin Therapy for CVD Prevention on??01/09/19.??Satisfied by Santi Ngo MD  ??? ? ? ?Blood Pressure Screening on??01/09/19.??Satisfied by Laura Sneed RN  ??? ? ? ?Body Mass Index Check on??12/18/18.??Satisfied by Soni Green LPN  ??? ? ? ?Colorectal Screening on??06/07/18.??Satisfied by Ashanti Byrne  ??? ? ? ?Coronary Artery Disease Maintenance-Lipid Lowering Therapy on??01/09/19.??Satisfied by Santi Ngo MD  ??? ? ? ?Depression Screening on??12/18/18.??Satisfied by Soni Green LPN  ??? ? ? ?Diabetes Screening on??01/09/19.??Satisfied by Erica Mckinney  ??? ? ? ?Hypertension Management-BMP on??01/09/19.??Satisfied by Erica Mckinney  ??? ? ? ?Hypertension Management-Blood Pressure on??01/09/19.??Satisfied by Laura Sneed RN  ??? ? ? ?Influenza Vaccine on??10/23/18.??Satisfied by Soni Green LPN  ??? ? ? ?Lipid Screening on??06/29/18.??Satisfied by [facility]  ??? ? ? ?Obesity Screening on??01/08/19.??Satisfied  by Vicenta Santos RN  ??? ? ? ?Smoking Cessation (Coronary Artery Disease) on??06/26/18.??Satisfied by Graciela Ann LPN  ?  ?     [1]?Office Visit Note; Cornell QUEVEDO, Sarah M. 05/30/2018 14:13 CDT  [2]?Office Visit Note; Cornell NP, Sarah M. 05/30/2018 14:13 CDT  [3]?Office Visit Note; Cornell NP, Sarah M. 05/30/2018 14:13 CDT  [4]?Office Visit Note; Cornell NP, Sarah M. 05/30/2018 14:13 CDT  [5]?Office Visit Note; Cornell NP, Sarah M. 05/30/2018 14:13 CDT  [6]?Office Visit Note; Cornell NP, Sarah M. 05/30/2018 14:13 CDT  [7]?Office Visit Note; Cornell NP, Sarah M. 05/30/2018 14:13 CDT  [8]?Office Visit Note; Cornell NP, Sarah M. 05/30/2018 14:13 CDT  [9]?Office Visit Note; Cornell NP, Sarah M. 05/30/2018 14:13 CDT

## 2022-05-02 NOTE — HISTORICAL [facility]
This is a historical note converted from [employer]. Formatting and pictures may have been removed.  Please reference [employer] for original formatting and attached multimedia. Chief Complaint  c/o midsternal chest pain at intervals. also c/o left foot pain. 2nd toe swelling noted. 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 ?He states that the pain fluctuates in intensity with no radiation. ?Patient states it often happens at rest and at work, but denies noticing it during exertion. ?Patient hasnt really noticed if anything makes it worse or better. ?And he hasnt taken anything that has made it better. ?His last episode of chest tightness/pain was earlier this morning, but is not currently active. ?Patient states he also does have occasionally severe acid reflux and he does not take medications for. ?He states he is unsure of whether these pains feel the same. ?He is also unsure of whether the pain is prevalent after meals. ?He states he does notice the pain when he does eat spicy meals. ?Patient denies any palpitations, shortness of breath, dizziness, loss of consciousness, fatigue, and weakness. ?Patient was last seen in cardiology clinic on 10/11/18 and was asymptomatic at the time, but recommended repeat TTE in 1 year and continue to follow-up with cardiology.  ?  In the ED, patient had negative troponins and no other significant laboratory findings. ?EKG showed a rate of 88 with normal sinus rhythm, no ST T changes, QRS interval showed possible left ventricular hypertrophy, and new right bundle branch block. Chest x-ray showed no acute  ?  Social history: History of cocaine use in past greater than 10 years ago, denies alcohol use since heart surgery, quit smoking right before her  surgery; smoked one fourth of a pack per day  Surgical history: Left atrial myxoma resection on 10/5/17  Family history: Mother  of heart attack and father has hypertension and type I diabetes  ?  Review of Systems  ????Constitutional: ?No fever, No chills, No sweats, No weakness, No fatigue. ?  ????Eye: ?No recent visual problem, No visual disturbances. ?  ????Ear/Nose/Mouth/Throat: ?No decreased hearing, No nasal congestion, No sore throat. ?  ????Respiratory: ?No shortness of breath, No cough, No sputum production, No hemoptysis, No wheezing. ?  ????Cardiovascular:??+ chest pain, No palpitations, No peripheral edema, No syncope. ?  ????Gastrointestinal: ?No nausea, No vomiting, No diarrhea, No constipation, No heartburn, No abdominal pain. ?  ????Genitourinary: ?No dysuria, No hematuria, No change in urine stream, No urethral discharge. ?  ????Endocrine: ?No excessive thirst, No polyuria, No cold intolerance, No heat intolerance, No excessive hunger. ?  ????Musculoskeletal: ?No back pain, No neck pain, No joint pain, No muscle pain, No claudication, No decreased range of motion, No trauma. ?  ????Integumentary: ?No rash, No pruritus, No abrasions, No breakdown, No burns, No skin lesion. ?  ????Neurologic: ?Alert and oriented X4, No abnormal balance, No confusion, No numbness, No tingling, No headache. ?  ????Psychiatric: ?No anxiety, No depression. ?  ?????  Physical Exam  Vitals & Measurements  T:?37? ?C (Oral)? HR:?53(Peripheral)? RR:?17? BP:?167/103? SpO2:?100%? WT:?72.25?kg?  ????General: ?Alert and oriented, No acute distress. ?  ????Eye: ?Pupils are equal, round and reactive to light, Extraocular movements are intact, Normal conjunctiva, Vision unchanged. ?  ????HENT: ?Normocephalic, Oral mucosa is moist, No pharyngeal erythema. ?  ????Neck: ?Supple, Non-tender, No carotid bruit, No jugular venous distention, No lymphadenopathy, No thyromegaly. ?  ????Respiratory: ?Lungs are clear to auscultation,  Respirations are non-labored, Breath sounds are equal, Symmetrical chest wall expansion, No chest wall tenderness. ?  ????Cardiovascular: ?Normal rate, Regular rhythm, No murmur, No gallop, Good pulses equal in all extremities, Normal peripheral perfusion, No edema. ?  ????Gastrointestinal: ?Soft, Non-tender, Non-distended, Normal bowel sounds, No organomegaly. ?  ????Genitourinary: ?No costovertebral angle tenderness. ?  ????Musculoskeletal: ?Normal range of motion, Normal strength, No tenderness, No swelling, No deformity, Normal gait. ?  ????Integumentary: ?Warm, Dry, Intact. ?Sternotomy scar present.  ????Neurologic: ?Alert, Oriented, Normal sensory, Normal motor function, No focal deficits, Cranial Nerves II-XII are grossly intact, Normal deep tendon reflexes. ?  ????Psychiatric: ?Cooperative, Appropriate mood & affect, Normal judgment. ?  ?????  ?  Assessment/Plan  Unstable angina  -Chest pain at rest; not associated with exertion  -Patient did not received nitroglycerin in ED  -EKG as above  -Troponins negative  -History of left atrial myxoma S/P resection  -Family history of heart disease  -NICHOLAS score: 2  -Exercise stress test tomorrow  -Echo ordered for tomorrow  -Nitroglycerin tablets when necessary  -Started on atorvastatin 20 mg  -Continue aspirin  -Consult cardiology in the a.m.  ?  Hypertension  -Controlled at home with losartan 25 mg daily and metoprolol 25 mg  -Continue home BP meds  -BP in ED was 142/90 with recheck of 167/103  -Labetalol when necessary  ?  Acid reflux  -Protonix 40 mg daily  -GI cocktail when necessary  ?  Prophylaxis: Lovenox DVT  ?  Disposition: Patient with possible new onset unstable angina with negative troponin. ?Patient scheduled for exercise stress test and echocardiography tomorrow. ?Consult cardiology in the a.m.  ?   Problem List/Past Medical History  Ongoing  Acute Hyperglycemia  Chronic stable angina  Myxoma of heart  Historical  No qualifying data  Procedure/Surgical  History  left atrial myxoma resection (10/05/2017)  Transesophageal Echo (for [facility]) (None) (07/31/2017)  denies  open heart surgery   Medications  Inpatient  acetaminophen 325 mg oral tablet, 1000 mg= 2 tab(s), Oral, q6hr, PRN  acetaminophen 325 mg oral tablet, 650 mg= 2 tab(s), Oral, q6hr, PRN  aspirin 81 mg oral tablet, CHEWABLE, 81 mg= 1 tab(s), Oral, Daily  atorvastatin 20 mg oral tablet, 20 mg= 1 tab(s), Oral, Once  enoxaparin 40 mg/0.4 mL subcutaneous solution, 40 mg= 0.4 mL, Subcutaneous, Daily  GI Cocktail - [facility]  labetalol 5 mg/mL intravenous solution, 20 mg= 4 mL, IV Push, q2hr, PRN  losartan 50 mg oral tablet, 25 mg= 0.5 tab(s), Oral, Daily  metoprolol tartrate 25 mg oral tab, 12.5 mg= 0.5 tab(s), Oral, BID  nitroglycerin 0.4 mg sublingual TAB, 0.4 mg= 1 tab(s), Oral, q5min, PRN  Protonix 40 mg ORAL enteric coated tablet, 40 mg= 1 tab(s), Oral, Daily  Zofran 2 mg/mL injectable solution, 4 mg= 2 mL, IV Push, q4hr, PRN  Zofran 2 mg/mL injectable solution, 8 mg= 4 mL, IV Slow, q4hr, PRN  Home  aspirin 81 mg oral tablet, 81 mg= 1 tab(s), Oral, Daily  losartan 25 mg oral tablet, 25 mg= 1 tab(s), Oral, Daily, 11 refills  metoprolol tartrate 25 mg oral tab, 12.5 mg= 0.5 tab(s), Oral, BID, 11 refills  Allergies  No Known Medication Allergies  Social History  Alcohol  Past, 07/20/2017  Employment/School  Employed, Work/School description: works at [employer]. Operates hazardous equipment: No., 05/30/2018  Exercise  Self assessment: Fair condition. Exercise type: no., 10/31/2017  Home/Environment  Lives with Significant other. Living situation: Home/Independent. Alcohol abuse in household: No. Substance abuse in household: No. Smoker in household: No. Injuries/Abuse/Neglect in household: No. Feels unsafe at home: No. Safe place to go: Yes. Family/Friends available for support: Yes. Concern for family members at home: No. Major illness in household: No. Financial concerns: No. TV/Computer concerns: No.,  10/31/2017  Nutrition/Health  Regular, Caffeine intake amount: coffee 2 cups/day. Wants to lose weight: No. Sleeping concerns: Yes. Feels highly stressed: No., 05/30/2018  Sexual  Sexually active: Yes. Number of current partners 1., 06/26/2018  Substance Abuse  Current, Marijuana, Daily, 12/05/2015  Tobacco  Former smoker, quit more than 30 days ago, N/A, 01/08/2019  Former smoker, No, 12/18/2018  Family History  Diabetes mellitus type 1.: Father.  Heart attack: Mother.  Hypertension.: Father.  Immunizations  Vaccine Date Status   hepatitis B adult vaccine 12/18/2018 Given   hepatitis B adult vaccine 10/23/2018 Given   influenza virus vaccine, inactivated 10/23/2018 Given   hepatitis B adult vaccine 07/12/2018 Given   Lab Results  Labs Last 24 Hours?  ?Chemistry? Hematology/Coagulation?   Sodium Lvl: 140 mmol/L (01/08/19 16:04:00) PT: 14.1 second(s) (01/08/19 16:04:00)   Potassium Lvl: 3.7 mmol/L (01/08/19 16:04:00) INR: 1.1 (01/08/19 16:04:00)   Chloride: 106 mmol/L (01/08/19 16:04:00) PTT: 28 second(s) (01/08/19 16:04:00)   CO2: 28 mmol/L (01/08/19 16:04:00) WBC: 8.5 x10(3)/mcL (01/08/19 16:04:00)   Calcium Lvl: 8.5 mg/dL (01/08/19 16:04:00) RBC:?4.3 x10(6)/mcL?Low (01/08/19 16:04:00)   Glucose Lvl:?176 mg/dL?High (01/08/19 16:04:00) Hgb:?13.1 gm/dL?Low (01/08/19 16:04:00)   BUN: 16 mg/dL (01/08/19 16:04:00) Hct:?38.6 %?Low (01/08/19 16:04:00)   Creatinine: 1 mg/dL (01/08/19 16:04:00) Platelet: 201 x10(3)/mcL (01/08/19 16:04:00)   eGFR-AA: 99 mL/min (01/08/19 16:04:00) MCV: 89.8 fL (01/08/19 16:04:00)   eGFR-JAZMYN:?82 mL/min?Low (01/08/19 16:04:00) MCH: 30.5 pg (01/08/19 16:04:00)   Bili Total: 0.3 mg/dL (01/08/19 16:04:00) MCHC: 33.9 gm/dL (01/08/19 16:04:00)   Bili Direct: 0.1 mg/dL (01/08/19 16:04:00) RDW: 12.7 % (01/08/19 16:04:00)   Bili Indirect: 0.2 mg/dL (01/08/19 16:04:00) MPV: 9.3 fL (01/08/19 16:04:00)   AST: 23 unit/L (01/08/19 16:04:00) Abs Neut: 5.5 x10(3)/mcL (01/08/19 16:04:00)   ALT: 22 unit/L  (01/08/19 16:04:00) Neutro Auto: 65 x10(3)/mcL (01/08/19 16:04:00)   Alk Phos: 63 unit/L (01/08/19 16:04:00) Lymph Auto: 26 % (01/08/19 16:04:00)   Total Protein: 7.6 gm/dL (01/08/19 16:04:00) Mono Auto: 7 % (01/08/19 16:04:00)   Albumin Lvl: 3.7 gm/dL (01/08/19 16:04:00) Eos Auto: 1 (01/08/19 16:04:00)   Globulin:?3.9 gm/mL?High (01/08/19 16:04:00) Abs Eos: 0.08 x10(3)/mcL (01/08/19 16:04:00)   A/G Ratio: 1 ratio (01/08/19 16:04:00) Basophil Auto: 0 (01/08/19 16:04:00)   NT pro BNP.: 62 pg/mL (01/08/19 16:04:00) Abs Neutro: 5.5 x10(3)/mcL (01/08/19 16:04:00)   Total CK:?350 unit/L?High (01/08/19 16:04:00) Abs Lymph: 2.2 x10(3)/mcL (01/08/19 16:04:00)   CK MB: 2.7 ng/mL (01/08/19 16:04:00) Abs Mono: 0.63 x10(3)/mcL (01/08/19 16:04:00)   Troponin-I: <0.015 (01/08/19 16:04:00) Abs Baso: 0.03 x10(3)/mcL (01/08/19 16:04:00)    IG%: 0 % (01/08/19 16:04:00)    IG#: 0.02 (01/08/19 16:04:00)   ?  ?  Diagnostic Results  Reason For Exam  Chest Pain  ?  Radiology Report  Chest 2 views  ?  Clinical history is chest pain  ?  This is compared to a previous study from 5/24/2017  ?  Lungs are clear. Heart size is within normal limits. The costophrenic  angles are clear.  ?  IMPRESSION: No acute disease is seen.  ?      HO-II addendum.?  Patient seen and examined with intern. Chart reviewed.?  ?  58-year-old  male who is admitted for diagnostic evaluation of unstable angina. ?Patient with negative troponins and nonspecific EKG changes. ?Endorses symptoms of chest pain during rest and sometimes with activity; patient also with history of heartburn-currently not on any medications; unable to delineate symptoms as being different from his heartburn discomfort. ?Patient also with a history of atrial myxoma s/p surgical repair-followed by [facility] cardiology; we will plan on ordering echocardiogram and an exercise EKG for review in the morning. SL NGN for recurrence of angina symptoms. ASA 81mg daily. ?Repeat troponin x1  ordered for review later today. Low threshold for this event being rule out ACS-this is why we will not proceed with DAPT; we will also let cardiology know of this patient and see if they have further recs. Troponin, EKG if recurrence of chest pain. Can also try GI cocktail. F/U AM labs.?  ?   I was present with the resident during the history and physical examination.  ???  [x ] I discussed the case with the resident and agree with the findings and plan as documented in the residents note.  [ ] I discussed the case with the resident and agree with the findings and plan as documented in the residents note except:  Chest pain with exertion- typical angina  Enzymes neg, EKG- no acute changes  Stable for discharge with out patient Janet  ?

## 2022-05-02 NOTE — HISTORICAL [facility]
This is a historical note converted from [employer]. Formatting and pictures may have been removed.  Please reference [employer] for original formatting and attached multimedia. Chief Complaint  referred by IM for CP. Pt c/o CP after exertion, and occasional dizziness, echo results  History of Present Illness  Patient is a 57-year-old male with recently diagnosed hepatitis C?referred to cardiology clinic for chest pain.? Patient?describes this?as a general chest pain that?occurs at least 3 times a week and lasts for about an hour. 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 He was seen in the ER (5/14/2017)?and EKG was obtained that showed abnormalities.? The patient had?an echocardiogram?2 weeks ago?showing EF 62%,?a mildly dilated left atrium,?and a mass suggestive of myxoma?(3.3 cm x 2.5 cm)?attached to the intra-atrial septum in the left atrium,?mild MR, and 1+ AR with normal mitral and aortic valves. ?The patient states that?he?has probably had weight loss in the past year, but does not know how much. ?He denies SOB, RODRIGUEZ,?swelling in his legs, PND, orthopnea,?dizziness/palpitations, or calf pain with ambulation.  ?  The patient states that he does not have any other medical problems.? He takes a baby aspirin prescribed by the ER daily.  ?  7/7/2017 lipid panel: Total 192, HDL 66,   ?  Fmhx: Father - HTN, DM??? mother - MI  Surgical Hx: Deneis surgical hx  SOC HX: Patient smokes?1-2 packs?a week. ?Former drinker.??Denies IV?drugs.  ?  Review of Systems  See HPI for pertinent positives.? ROS is otherwise noncontributory.  Physical Exam  Vitals & Measurements  T:?36.7? ?C ?(Oral)? HR:?55?(Peripheral)? BP:?122/82? SpO2:?100%?  HT:?168?cm? HT:?168?cm? WT:?67.0?kg? WT:?67.0?kg? BMI:?23.74?  General: NAD, sits comfortably on exam table  Neck: No JVD, no carotid bruits auscultated  Resp: LCTAB, No crackles/wheezes/rales,  non-labored WOB  CV: RRR, no murmurs, S1 and S2 present, no S3/S4, PMI not displaced, 2+ RP Bilaterally  Extrem: Forearms and ankles warm, no dependent edema in LE  Skin: No rash or superficial lesions  ?  ?  Assessment/Plan  Left atrial myxoma,?angina, weight loss  ?  ?- Seen on TTE?2 weeks ago. ?Likely causing the patients angina.  - Needs CHARLENE for complete visualization?of mass  - We will order  ?  Tobacco use  ?  - Patient counseled on tobacco cessation. ?Does not think he smokes much.  ?  Scheduling CHARLENE?in 2 weeks  RTC 3-4 weeks after CHARLENE (required to send the patient to [facility] for myxoma excision)   Problem List/Past Medical History  Acute Hyperglycemia  Chronic stable angina  Historical  No historical problems  Procedure/Surgical History  denies.  Medications  aspirin 81 mg oral tablet, 81 mg, 1 tab(s), Oral, Daily  MiraLax oral powder for reconstitution, 17 gm, Oral, Daily, PRN  Allergies  No Known Medication Allergies  Social History  Alcohol  Past  Current, 1-2 times per month  Employment/School  Employed  Exercise  Exercise type: no.  Home/Environment  Lives with Significant other. Alcohol abuse in household: No. Substance abuse in household: No. Smoker in household: No. Safe place to go: Yes.  Nutrition/Health  Regular  Substance Abuse  Current, Marijuana, Daily  Tobacco  Light tobacco smoker  Family History  Diabetes mellitus type 1.: Father.  Heart attack: Mother.  Hypertension.: Father.      I was present with the resident during the history and exam.  ? ?  [?x ] I discussed the case with the resident and agree with the findings and plan as documented in the resident?s note.  [ ] I discussed the case with the resident and agree with the findings and plan as documented in the resident?s note except: [ ]  ?

## 2022-06-23 ENCOUNTER — HOSPITAL ENCOUNTER (EMERGENCY)
Facility: HOSPITAL | Age: 62
Discharge: HOME | End: 2022-06-23
Attending: FAMILY MEDICINE
Payer: MEDICAID

## 2022-06-23 VITALS
SYSTOLIC BLOOD PRESSURE: 164 MMHG | TEMPERATURE: 97 F | RESPIRATION RATE: 20 BRPM | HEART RATE: 72 BPM | BODY MASS INDEX: 25.22 KG/M2 | WEIGHT: 160.69 LBS | OXYGEN SATURATION: 100 % | HEIGHT: 67 IN | DIASTOLIC BLOOD PRESSURE: 93 MMHG

## 2022-06-23 DIAGNOSIS — M25.551 ACUTE HIP PAIN, RIGHT: ICD-10-CM

## 2022-06-23 DIAGNOSIS — M25.551 RIGHT HIP PAIN: Primary | ICD-10-CM

## 2022-06-23 PROCEDURE — 96372 THER/PROPH/DIAG INJ SC/IM: CPT | Performed by: FAMILY MEDICINE

## 2022-06-23 PROCEDURE — 99284 EMERGENCY DEPT VISIT MOD MDM: CPT | Mod: 25

## 2022-06-23 PROCEDURE — 63600175 PHARM REV CODE 636 W HCPCS: Performed by: FAMILY MEDICINE

## 2022-06-23 RX ORDER — DICLOFENAC SODIUM 75 MG/1
75 TABLET, DELAYED RELEASE ORAL 2 TIMES DAILY
Qty: 20 TABLET | Refills: 0 | Status: SHIPPED | OUTPATIENT
Start: 2022-06-23 | End: 2022-07-01 | Stop reason: ALTCHOICE

## 2022-06-23 RX ORDER — METHOCARBAMOL 500 MG/1
1000 TABLET, FILM COATED ORAL 3 TIMES DAILY
Qty: 30 TABLET | Refills: 0 | Status: SHIPPED | OUTPATIENT
Start: 2022-06-23 | End: 2022-06-28

## 2022-06-23 RX ORDER — KETOROLAC TROMETHAMINE 30 MG/ML
60 INJECTION, SOLUTION INTRAMUSCULAR; INTRAVENOUS
Status: COMPLETED | OUTPATIENT
Start: 2022-06-23 | End: 2022-06-23

## 2022-06-23 RX ADMIN — KETOROLAC TROMETHAMINE 60 MG: 30 INJECTION, SOLUTION INTRAMUSCULAR at 10:06

## 2022-06-24 NOTE — ED PROVIDER NOTES
"Encounter Date: 6/23/2022       History     Chief Complaint   Patient presents with    Leg Pain     Non traumatic RLE pain, "for weeks".      61 y/o male presents to the ER with complaints of right lateral hip pain x 2-3 weeks.  No injury.  Pain worse in the right groin area and right lateral hip, worse with walking.  Better at night when sleeping and lying flat.  No dysuria or hematuria.  No abdominal pain, nausea, or vomiting.  No constipation or diarrhea.  Pain moderated.          Review of patient's allergies indicates:  No Known Allergies  History reviewed. No pertinent past medical history.  History reviewed. No pertinent surgical history.  History reviewed. No pertinent family history.     Review of Systems   Constitutional: Negative for chills, fatigue and fever.   HENT: Negative for ear pain, rhinorrhea and sore throat.    Eyes: Negative for photophobia and pain.   Respiratory: Negative for cough, shortness of breath and wheezing.    Cardiovascular: Negative for chest pain.   Gastrointestinal: Negative for abdominal pain, diarrhea, nausea and vomiting.   Genitourinary: Negative for dysuria.   Neurological: Negative for dizziness, weakness and headaches.   All other systems reviewed and are negative.      Physical Exam     Initial Vitals [06/23/22 2146]   BP Pulse Resp Temp SpO2   (!) 192/97 60 20 97.2 °F (36.2 °C) 100 %      MAP       --         Physical Exam    Nursing note and vitals reviewed.  Constitutional: He appears well-developed and well-nourished.   HENT:   Head: Normocephalic and atraumatic.   Eyes: EOM are normal. Pupils are equal, round, and reactive to light.   Neck: Neck supple.   Normal range of motion.  Cardiovascular: Normal rate, regular rhythm and normal heart sounds. Exam reveals no gallop and no friction rub.    No murmur heard.  Pulmonary/Chest: Breath sounds normal. No respiratory distress.   Abdominal: Abdomen is soft. Bowel sounds are normal. He exhibits no distension. There is no " abdominal tenderness.   No inguinal lymphadenopathy; No inguinal hernias present.  No testicular swelling.   Musculoskeletal:         General: Normal range of motion.      Cervical back: Normal range of motion and neck supple.      Comments: Full range of motion of the right hip.  Walks with a mild antalgic gait.  No right lateral greater trochanter tenderness to palpation.     Neurological: He is alert and oriented to person, place, and time. He has normal strength.   Skin: Skin is warm and dry.   Psychiatric: He has a normal mood and affect. His behavior is normal. Judgment and thought content normal.         ED Course   Procedures  Labs Reviewed - No data to display       Imaging Results          X-Ray Hip 2 or 3 views Right (with Pelvis when performed) (Preliminary result)  Result time 06/23/22 23:39:12    ED Interpretation by Mingo Montalvo MD (06/23/22 23:39:12, [facility], Emergency Medicine)    No acute abnormality.                               Medications   ketorolac injection 60 mg (60 mg Intramuscular Given 6/23/22 2226)                 ED Course as of 06/23/22 2341   Thu Jun 23, 2022   2340 No acute abnormalities noted on the x-ray of the right hip.  Patient did report recently having an MRI performed; noted to have contrast type material in the bladder on xray [MW]      ED Course User Index  [MW] Mingo Montalvo MD             Clinical Impression:   Final diagnoses:  [M25.551] Acute hip pain, right  [M25.551] Right hip pain (Primary)          ED Disposition Condition    Discharge Stable        ED Prescriptions     Medication Sig Dispense Start Date End Date Auth. Provider    diclofenac (VOLTAREN) 75 MG EC tablet Take 1 tablet (75 mg total) by mouth 2 (two) times daily. for 10 days 20 tablet 6/23/2022 7/3/2022 Mingo Montalvo MD    methocarbamoL (ROBAXIN) 500 MG Tab Take 2 tablets (1,000 mg total) by mouth 3 (three) times daily. for 5 days 30 tablet 6/23/2022  6/28/2022 Mingo Montalvo MD        Follow-up Information     Follow up With Specialties Details Why Contact Info    [facility] Emergency Medicine  As needed, If symptoms worsen [address] [place] [address] [phone]    Primary Care Physician  In 5 days             Mingo Montalvo MD  06/23/22 [id number]

## 2022-07-01 ENCOUNTER — HOSPITAL ENCOUNTER (EMERGENCY)
Facility: HOSPITAL | Age: 62
Discharge: HOME | End: 2022-07-01
Attending: FAMILY MEDICINE
Payer: MEDICAID

## 2022-07-01 VITALS
OXYGEN SATURATION: 100 % | BODY MASS INDEX: 26.81 KG/M2 | TEMPERATURE: 98 F | DIASTOLIC BLOOD PRESSURE: 86 MMHG | HEART RATE: 62 BPM | HEIGHT: 65 IN | RESPIRATION RATE: 18 BRPM | WEIGHT: 160.94 LBS | SYSTOLIC BLOOD PRESSURE: 152 MMHG

## 2022-07-01 DIAGNOSIS — M54.31 BACK PAIN WITH RIGHT-SIDED SCIATICA: Primary | ICD-10-CM

## 2022-07-01 DIAGNOSIS — R10.2 CHRONIC PELVIC PAIN IN MALE: Primary | ICD-10-CM

## 2022-07-01 DIAGNOSIS — G89.29 CHRONIC PELVIC PAIN IN MALE: Primary | ICD-10-CM

## 2022-07-01 PROCEDURE — 63600175 PHARM REV CODE 636 W HCPCS: Performed by: NURSE PRACTITIONER

## 2022-07-01 PROCEDURE — 96372 THER/PROPH/DIAG INJ SC/IM: CPT | Performed by: NURSE PRACTITIONER

## 2022-07-01 PROCEDURE — 99284 EMERGENCY DEPT VISIT MOD MDM: CPT | Mod: 25

## 2022-07-01 PROCEDURE — 25000003 PHARM REV CODE 250: Performed by: NURSE PRACTITIONER

## 2022-07-01 RX ORDER — INDOMETHACIN 25 MG/1
25 CAPSULE ORAL 2 TIMES DAILY WITH MEALS
Qty: 14 CAPSULE | Refills: 0 | Status: SHIPPED | OUTPATIENT
Start: 2022-07-01 | End: 2022-07-08

## 2022-07-01 RX ORDER — METOPROLOL TARTRATE 25 MG/1
12.5 TABLET, FILM COATED ORAL 2 TIMES DAILY
COMMUNITY
Start: 2022-05-13

## 2022-07-01 RX ORDER — GABAPENTIN 300 MG/1
300 CAPSULE ORAL
Status: COMPLETED | OUTPATIENT
Start: 2022-07-01 | End: 2022-07-01

## 2022-07-01 RX ORDER — BACLOFEN 10 MG/1
10 TABLET ORAL 3 TIMES DAILY
Qty: 21 TABLET | Refills: 0 | Status: SHIPPED | OUTPATIENT
Start: 2022-07-01 | End: 2024-01-20

## 2022-07-01 RX ORDER — AMLODIPINE BESYLATE 2.5 MG/1
2.5 TABLET ORAL NIGHTLY
COMMUNITY
Start: 2022-05-13

## 2022-07-01 RX ORDER — OMEPRAZOLE 20 MG/1
20 CAPSULE, DELAYED RELEASE ORAL DAILY
COMMUNITY
Start: 2022-05-13

## 2022-07-01 RX ORDER — ASPIRIN 81 MG/1
81 TABLET ORAL DAILY
COMMUNITY

## 2022-07-01 RX ORDER — LOSARTAN POTASSIUM 100 MG/1
100 TABLET ORAL DAILY
COMMUNITY
Start: 2022-05-13

## 2022-07-01 RX ORDER — KETOROLAC TROMETHAMINE 30 MG/ML
30 INJECTION, SOLUTION INTRAMUSCULAR; INTRAVENOUS
Status: COMPLETED | OUTPATIENT
Start: 2022-07-01 | End: 2022-07-01

## 2022-07-01 RX ORDER — HYDROCODONE BITARTRATE AND ACETAMINOPHEN 10; 325 MG/1; MG/1
1 TABLET ORAL EVERY 6 HOURS PRN
COMMUNITY
Start: 2022-06-27

## 2022-07-01 RX ORDER — GABAPENTIN 300 MG/1
300 CAPSULE ORAL DAILY
Qty: 30 CAPSULE | Refills: 0 | Status: SHIPPED | OUTPATIENT
Start: 2022-07-01 | End: 2022-07-31

## 2022-07-01 RX ADMIN — KETOROLAC TROMETHAMINE 30 MG: 30 INJECTION, SOLUTION INTRAMUSCULAR at 09:07

## 2022-07-01 RX ADMIN — GABAPENTIN 300 MG: 300 CAPSULE ORAL at 09:07

## 2022-07-01 NOTE — Clinical Note
"Palmer Ling" Joni was seen and treated in our emergency department on 7/1/2022.  He may return to work on 07/04/2022.       If you have any questions or concerns, please don't hesitate to call.       LPN    "

## 2022-07-01 NOTE — DISCHARGE INSTRUCTIONS
Follow up with PCP in 5-7 days for additional evaluation.  Warm compresses to affected areas and soak in Epsom Salt for comfort.  Take pain medication as directed for up to 7 days.  Keep scheduled appointment with Neurology Services as planned.

## 2022-07-01 NOTE — ED PROVIDER NOTES
Encounter Date: 7/1/2022       History     Chief Complaint   Patient presents with    Leg Pain     Patient reports chronic right leg pain. No new or worsening symptoms reported but pain medicine is not working.      Pt is a 62 y.o. male who presents to the [facility] ED complaining of chronic pain to Rt hip, radiating into her leg. Known spinal changes, arthritis. Currently awaiting follow up with his physician. Seen for same issue on 6/23 at [facility] ED but denies relief with prescribed medications. Denies loss of bowel or bladder control, chest pain, SOB, weakness, dizziness, abdominal pain, or direct injury to affected area.         Review of patient's allergies indicates:  No Known Allergies  History reviewed. No pertinent past medical history.  History reviewed. No pertinent surgical history.  History reviewed. No pertinent family history.  Social History     Tobacco Use    Smoking status: Never Smoker    Smokeless tobacco: Never Used   Substance Use Topics    Drug use: Never     Review of Systems   Constitutional: Negative for chills, diaphoresis, fatigue and fever.   HENT: Negative for facial swelling, postnasal drip, rhinorrhea, sinus pressure, sinus pain, sore throat and trouble swallowing.    Respiratory: Negative for cough, chest tightness, shortness of breath and wheezing.    Cardiovascular: Negative for chest pain, palpitations and leg swelling.   Gastrointestinal: Negative for abdominal pain, diarrhea, nausea and vomiting.   Genitourinary: Negative for dysuria, flank pain, hematuria and urgency.   Musculoskeletal: Positive for arthralgias, back pain and myalgias.   Skin: Negative for color change and rash.   Neurological: Negative for dizziness, syncope, weakness and headaches.   Hematological: Does not bruise/bleed easily.   All other systems reviewed and are negative.      Physical Exam     Initial Vitals [07/01/22 0908]   BP Pulse Resp Temp SpO2   (!) 152/86 62 18 98.2 °F (36.8 °C) 100 %      MAP       --          Physical Exam    Nursing note and vitals reviewed.  Constitutional: Vital signs are normal. He appears well-developed and well-nourished.   HENT:   Head: Normocephalic.   Nose: Nose normal.   Mouth/Throat: Oropharynx is clear and moist.   Eyes: Conjunctivae and EOM are normal. Pupils are equal, round, and reactive to light.   Neck: Neck supple.   Normal range of motion.  Cardiovascular: Normal rate, regular rhythm, normal heart sounds and intact distal pulses.   Pulmonary/Chest: Effort normal and breath sounds normal. No respiratory distress. He has no wheezes. He has no rhonchi. He has no rales. He exhibits no tenderness.   Abdominal: Abdomen is soft and flat. Bowel sounds are normal. There is no abdominal tenderness. There is no rebound, no guarding, no tenderness at McBurney's point and negative Álvarez's sign.   Musculoskeletal:         General: Normal range of motion.      Cervical back: Normal range of motion and neck supple.      Lumbar back: Spasms and tenderness (Rt sided) present. No swelling, edema or deformity.      Comments: Muscle spasms present along Rt portion of lumbar spine and sacrum.Tenderness present along lateral aspect of Rt leg. Muscle spasms present to Rt gluteal.       Neurological: He is alert and oriented to person, place, and time. He has normal strength.   Skin: Skin is warm and dry. Capillary refill takes less than 2 seconds.   Psychiatric: He has a normal mood and affect. His behavior is normal. Judgment and thought content normal.         ED Course   Procedures  Labs Reviewed - No data to display       Imaging Results    None          Medications   ketorolac injection 30 mg (has no administration in time range)   gabapentin capsule 300 mg (has no administration in time range)     Medical Decision Making:   History:   Old Medical Records: I decided to obtain old medical records.  Differential Diagnosis:   Sciatica  Arthritis    Clinical Tests:   Radiological Study: Reviewed  ED  Management:  9:32 AM Reassessed patient at this time. Reports condition has improved. Discussed with patient all pertinent ED information and results. Discussed diagnosis and treatment plan with patient. Follow up instructions and return to ED instruction have been given. All questions and concerns were addressed at this time. Patient voices understanding of information and instructions. Patient is comfortable with plan and discharge. Patient is stable for discharge.     Next appt: 07/12/2022 at 10:15 AM in Neurosurgery (Reese Mccarthy MD)    Dx: Acute hip pain, right    0 Result Notes    Details      Reading Physician Reading Date Result Priority  Oli Liu MD  [phone] 6/24/2022 STAT    Narrative & Impression  EXAMINATION:  XR HIP WITH PELVIS WHEN PERFORMED, 2 OR 3  VIEWS RIGHT     CLINICAL HISTORY:  Pain in right hip     TECHNIQUE:  AP view of the pelvis and frog leg lateral view of the right hip     COMPARISON:  Radiography 12/12/2016 and CT 10/17/2018     FINDINGS:  Excreted IV contrast in the bladder.  No acute fracture identified.  Right hip is aligned with preserved joint space.  Unchanged 13 mm sclerotic focus in the left ilium.     Impression:     No acute osseous process appreciated.        Electronically signed by: Oli Liu  Date:                                            06/24/2022  Time:                                           10:20                          Clinical Impression:   Final diagnoses:  [M54.31] Back pain with right-sided sciatica (Primary)          ED Disposition Condition    Discharge Stable        ED Prescriptions     Medication Sig Dispense Start Date End Date Auth. Provider    baclofen (LIORESAL) 10 MG tablet Take 1 tablet (10 mg total) by mouth 3 (three) times daily. for 7 days 21 tablet 7/1/2022 7/8/2022 Shreyas Kim Jr., FNP    indomethacin (INDOCIN) 25 MG capsule Take 1 capsule (25 mg total) by mouth 2 (two) times daily with meals. for 7 days 14 capsule  7/1/2022 7/8/2022 Shreyas Kim Jr., RIANAP    gabapentin (NEURONTIN) 300 MG capsule Take 1 capsule (300 mg total) by mouth once daily. 30 capsule 7/1/2022 7/31/2022 Shreyas Kim Jr., RIANAP        Follow-up Information     Follow up With Specialties Details Why Contact Info    [facility] - Emergency Dept Emergency Medicine In 3 days As needed, If symptoms worsen [address] [facility] [address] [phone]           Shreyas Kim Jr., JACOBO  07/01/22 0936

## 2022-07-12 PROBLEM — M89.9 BONE LESION: Status: ACTIVE | Noted: 2022-07-12

## 2022-07-12 PROBLEM — M51.369 BULGE OF LUMBAR DISC WITHOUT MYELOPATHY: Status: ACTIVE | Noted: 2022-07-12

## 2022-07-12 PROBLEM — M51.369 DDD (DEGENERATIVE DISC DISEASE), LUMBAR: Status: ACTIVE | Noted: 2022-07-12

## 2022-07-12 PROBLEM — M51.36 DDD (DEGENERATIVE DISC DISEASE), LUMBAR: Status: ACTIVE | Noted: 2022-07-12

## 2022-07-12 PROBLEM — M51.36 BULGE OF LUMBAR DISC WITHOUT MYELOPATHY: Status: ACTIVE | Noted: 2022-07-12

## 2022-07-12 PROBLEM — M48.061 NEUROFORAMINAL STENOSIS OF LUMBAR SPINE: Status: ACTIVE | Noted: 2022-07-12

## 2022-07-29 DIAGNOSIS — M85.60 CYST OF BONE: ICD-10-CM

## 2022-07-29 DIAGNOSIS — M89.9 BONE LESION: Primary | ICD-10-CM

## 2022-08-08 ENCOUNTER — HOSPITAL ENCOUNTER (OUTPATIENT)
Dept: RADIOLOGY | Facility: HOSPITAL | Age: 62
Discharge: HOME | End: 2022-08-08
Attending: NURSE PRACTITIONER
Payer: MEDICAID

## 2022-08-08 DIAGNOSIS — M85.60 CYST OF BONE: ICD-10-CM

## 2022-08-08 DIAGNOSIS — M89.9 BONE LESION: ICD-10-CM

## 2022-08-08 PROCEDURE — 78815 PET IMAGE W/CT SKULL-THIGH: CPT | Mod: TC

## 2022-10-28 ENCOUNTER — HOSPITAL ENCOUNTER (EMERGENCY)
Facility: HOSPITAL | Age: 62
Discharge: HOME | End: 2022-10-28
Attending: FAMILY MEDICINE
Payer: MEDICAID

## 2022-10-28 VITALS
SYSTOLIC BLOOD PRESSURE: 139 MMHG | DIASTOLIC BLOOD PRESSURE: 98 MMHG | HEART RATE: 82 BPM | WEIGHT: 160 LBS | BODY MASS INDEX: 26.66 KG/M2 | OXYGEN SATURATION: 100 % | RESPIRATION RATE: 18 BRPM | TEMPERATURE: 99 F

## 2022-10-28 DIAGNOSIS — J10.1 INFLUENZA A: Primary | ICD-10-CM

## 2022-10-28 DIAGNOSIS — R05.1 ACUTE COUGH: ICD-10-CM

## 2022-10-28 LAB
FLUAV AG UPPER RESP QL IA.RAPID: DETECTED
FLUBV AG UPPER RESP QL IA.RAPID: NOT DETECTED
SARS-COV-2 RNA RESP QL NAA+PROBE: NOT DETECTED

## 2022-10-28 PROCEDURE — 99284 EMERGENCY DEPT VISIT MOD MDM: CPT | Mod: 25

## 2022-10-28 PROCEDURE — 0241U COVID/FLU A&B PCR: CPT | Performed by: PHYSICIAN ASSISTANT

## 2022-10-28 RX ORDER — BENZONATATE 200 MG/1
200 CAPSULE ORAL 3 TIMES DAILY PRN
Qty: 20 CAPSULE | Refills: 0 | Status: SHIPPED | OUTPATIENT
Start: 2022-10-28

## 2022-10-28 RX ORDER — CETIRIZINE HYDROCHLORIDE 10 MG/1
10 TABLET ORAL DAILY
Qty: 14 TABLET | Refills: 0 | Status: SHIPPED | OUTPATIENT
Start: 2022-10-28 | End: 2024-01-20

## 2022-10-28 NOTE — ED PROVIDER NOTES
Encounter Date: 10/28/2022       History     Chief Complaint   Patient presents with    Cough     X1 week, saw pcp, no tests, no fever.      63 yo M w/ PMHx significant for HTN, CAD & GERD presents to ED c/o 1 week hx of dry cough w/ associated fatigue. Patient saw his PCP & was given meds for cough, but reports they aren't helping. PCP did not test patient for COVID or flu. Reports his granddaughter was recently sick w/ URI. Denies SOB, wheezing, hemoptysis, CP, palpitations, diaphoresis, syncope, orthopnea, edema, F/C, unexplained weight loss, night sweats, body aches, congestion, rhinorrhea, sore throat. VSS on arrival w/ NAD.    Review of patient's allergies indicates:  No Known Allergies  No past medical history on file.  No past surgical history on file.  No family history on file.  Social History     Tobacco Use    Smoking status: Never    Smokeless tobacco: Never   Substance Use Topics    Drug use: Never     Review of Systems   Constitutional:  Positive for fatigue. Negative for chills, diaphoresis and fever.   HENT:  Negative for congestion, rhinorrhea, sinus pressure, sinus pain and sore throat.    Eyes:  Negative for visual disturbance.   Respiratory:  Positive for cough. Negative for shortness of breath and wheezing.    Cardiovascular:  Negative for chest pain, palpitations and leg swelling.   Gastrointestinal:  Negative for abdominal pain, diarrhea, nausea and vomiting.   Musculoskeletal:  Negative for arthralgias, back pain and myalgias.   Skin:  Negative for color change, pallor and rash.   Allergic/Immunologic: Negative for immunocompromised state.   Neurological:  Negative for dizziness, syncope, light-headedness and headaches.   Hematological:  Negative for adenopathy.   Psychiatric/Behavioral:  Negative for confusion.    All other systems reviewed and are negative.    Physical Exam     Initial Vitals [10/28/22 0939]   BP Pulse Resp Temp SpO2   (!) 139/98 82 18 98.7 °F (37.1 °C) 100 %      MAP        --         Physical Exam    Nursing note and vitals reviewed.  Constitutional: He appears well-developed and well-nourished. He is not diaphoretic. No distress.   HENT:   Head: Normocephalic and atraumatic.   Eyes: Conjunctivae and EOM are normal. Pupils are equal, round, and reactive to light.   Neck: Neck supple.   Normal range of motion.  Cardiovascular:  Normal rate, regular rhythm and intact distal pulses.     Exam reveals no gallop and no friction rub.       No murmur heard.  Pulmonary/Chest: Breath sounds normal. No respiratory distress. He has no wheezes. He has no rhonchi. He has no rales. He exhibits no tenderness.   Abdominal: Abdomen is soft. Bowel sounds are normal. He exhibits no distension. There is no abdominal tenderness. There is no rebound and no guarding.   Musculoskeletal:         General: No tenderness or edema. Normal range of motion.      Cervical back: Normal range of motion and neck supple.     Neurological: He is alert and oriented to person, place, and time. No cranial nerve deficit or sensory deficit.   Skin: Skin is warm and dry. Capillary refill takes less than 2 seconds. No erythema. No pallor.   Psychiatric: He has a normal mood and affect.       ED Course   Procedures  Labs Reviewed   COVID/FLU A&B PCR - Abnormal; Notable for the following components:       Result Value    Influenza A PCR Detected (*)     All other components within normal limits    Narrative:     The Xpert Xpress SARS-CoV-2/FLU/RSV plus is a rapid, multiplexed real-time PCR test intended for the simultaneous qualitative detection and differentiation of SARS-CoV-2, Influenza A, Influenza B, and respiratory syncytial virus (RSV) viral RNA in either nasopharyngeal swab or nasal swab specimens.                Imaging Results              X-Ray Chest PA And Lateral (Final result)  Result time 10/28/22 11:31:51      Final result by Mariya Ross MD (10/28/22 11:31:51)                   Impression:      No acute  cardiopulmonary abnormality.      Electronically signed by: Mariya Ross  Date:    10/28/2022  Time:    11:31               Narrative:    EXAMINATION:  XR CHEST PA AND LATERAL    CLINICAL HISTORY:  cough;    TECHNIQUE:  Two views of the chest    COMPARISON:  06/12/2020    FINDINGS:  LINES AND TUBES: None    MEDIASTINUM AND TARIQ: The cardiac silhouette is normal.    LUNGS: No lobar consolidation. No edema.    PLEURA:No pleural effusion. No pneumothorax.    BONES: Postop sternotomy.                                       Medications - No data to display  Medical Decision Making:   Clinical Tests:   Lab Tests: Ordered and Reviewed  Radiological Study: Ordered and Reviewed  Patient is positive for flu A. Patient is outside of window for tamiflu. CXR unremarkable w/ no focal consolidation or other acute abnormality. No indication for abx. Will discharge w/ antitussive for symptom relief. Instructed to follow-up w/ PCP. ED precautions given.                        Clinical Impression:   Final diagnoses:  [J10.1] Influenza A (Primary)  [R05.1] Acute cough      ED Disposition Condition    Discharge Stable          ED Prescriptions       Medication Sig Dispense Start Date End Date Auth. Provider    benzonatate (TESSALON) 200 MG capsule Take 1 capsule (200 mg total) by mouth 3 (three) times daily as needed for Cough. 20 capsule 10/28/2022 -- BRIDGET Yoo    cetirizine (ZYRTEC) 10 MG tablet Take 1 tablet (10 mg total) by mouth once daily. 14 tablet 10/28/2022 -- BRIDGET Yoo          Follow-up Information       Follow up With Specialties Details Why Contact Info    Call PCP on Monday        [facility] - Emergency Dept Emergency Medicine  If symptoms worsen [address] [facility] [address] [phone]             BRIDGET Yoo  10/28/22 9180

## 2022-10-28 NOTE — DISCHARGE INSTRUCTIONS
Report to Emergency Department if symptoms return or worsen; [facility] - Medicine Clinic Within 1 to 2 days, It is important that you follow up with your primary care provider or specialist if indicated for further evaluation, workup, and treatment as necessary. The exam and treatment you received in Emergency Department was for an urgent problem and NOT INTENDED AS COMPLETE CARE. It is important that you FOLLOW UP with a doctor for ongoing care. If your symptoms become WORSE or you DO NOT IMPROVE and you are unable to reach your health care provider, you should RETURN to the Emergency Department. The Emergency Department provider has provided a PRELIMINARY INTERPRETATION of all your studies. A final interpretation may be done after you are discharged. If a change in your diagnosis or treatment is needed WE WILL CONTACT YOU. It is critical that we have a CURRENT PHONE NUMBER FOR YOU.

## 2022-10-28 NOTE — Clinical Note
"Palmer Ling" Joni was seen and treated in our emergency department on 10/28/2022.  He may return to work on 11/01/2022.       If you have any questions or concerns, please don't hesitate to call.       LPN    "

## 2022-11-30 ENCOUNTER — HOSPITAL ENCOUNTER (OUTPATIENT)
Facility: HOSPITAL | Age: 62
Discharge: HOME | End: 2022-11-30
Attending: INTERNAL MEDICINE | Admitting: INTERNAL MEDICINE
Payer: MEDICAID

## 2022-11-30 VITALS
HEART RATE: 46 BPM | SYSTOLIC BLOOD PRESSURE: 156 MMHG | WEIGHT: 160.5 LBS | HEIGHT: 68 IN | BODY MASS INDEX: 24.32 KG/M2 | RESPIRATION RATE: 20 BRPM | DIASTOLIC BLOOD PRESSURE: 97 MMHG | OXYGEN SATURATION: 98 % | TEMPERATURE: 99 F

## 2022-11-30 DIAGNOSIS — M51.36 BULGE OF LUMBAR DISC WITHOUT MYELOPATHY: Primary | ICD-10-CM

## 2022-11-30 DIAGNOSIS — I20.89 ANGINA DECUBITUS: ICD-10-CM

## 2022-11-30 LAB — POCT GLUCOSE: 94 MG/DL (ref 70–110)

## 2022-11-30 PROCEDURE — 63600175 PHARM REV CODE 636 W HCPCS: Performed by: INTERNAL MEDICINE

## 2022-11-30 PROCEDURE — 27201423 OPTIME MED/SURG SUP & DEVICES STERILE SUPPLY: Performed by: INTERNAL MEDICINE

## 2022-11-30 PROCEDURE — C1769 GUIDE WIRE: HCPCS | Performed by: INTERNAL MEDICINE

## 2022-11-30 PROCEDURE — 25000003 PHARM REV CODE 250: Performed by: INTERNAL MEDICINE

## 2022-11-30 PROCEDURE — 25500020 PHARM REV CODE 255: Performed by: INTERNAL MEDICINE

## 2022-11-30 PROCEDURE — C1887 CATHETER, GUIDING: HCPCS | Performed by: INTERNAL MEDICINE

## 2022-11-30 PROCEDURE — C1894 INTRO/SHEATH, NON-LASER: HCPCS | Performed by: INTERNAL MEDICINE

## 2022-11-30 PROCEDURE — 93454 CORONARY ARTERY ANGIO S&I: CPT | Performed by: INTERNAL MEDICINE

## 2022-11-30 RX ORDER — DIPHENHYDRAMINE HCL 25 MG
50 CAPSULE ORAL
Status: DISPENSED | OUTPATIENT
Start: 2022-11-30

## 2022-11-30 RX ORDER — SODIUM CHLORIDE 9 MG/ML
INJECTION, SOLUTION INTRAVENOUS ONCE
Status: COMPLETED | OUTPATIENT
Start: 2022-11-30 | End: 2022-11-30

## 2022-11-30 RX ORDER — ONDANSETRON 4 MG/1
8 TABLET, ORALLY DISINTEGRATING ORAL EVERY 8 HOURS PRN
Status: DISCONTINUED | OUTPATIENT
Start: 2022-11-30 | End: 2022-11-30 | Stop reason: HOSPADM

## 2022-11-30 RX ORDER — TAMSULOSIN HYDROCHLORIDE 0.4 MG/1
0.4 CAPSULE ORAL DAILY
COMMUNITY

## 2022-11-30 RX ORDER — DIAZEPAM 5 MG/1
10 TABLET ORAL ONCE
Status: DISCONTINUED | OUTPATIENT
Start: 2022-11-30 | End: 2022-11-30

## 2022-11-30 RX ORDER — NITROGLYCERIN 20 MG/100ML
INJECTION INTRAVENOUS
Status: DISCONTINUED | OUTPATIENT
Start: 2022-11-30 | End: 2022-11-30 | Stop reason: HOSPADM

## 2022-11-30 RX ORDER — MIDAZOLAM HYDROCHLORIDE 1 MG/ML
INJECTION INTRAMUSCULAR; INTRAVENOUS
Status: DISCONTINUED | OUTPATIENT
Start: 2022-11-30 | End: 2022-11-30 | Stop reason: HOSPADM

## 2022-11-30 RX ORDER — FENTANYL CITRATE 50 UG/ML
INJECTION, SOLUTION INTRAMUSCULAR; INTRAVENOUS
Status: DISCONTINUED | OUTPATIENT
Start: 2022-11-30 | End: 2022-11-30 | Stop reason: HOSPADM

## 2022-11-30 RX ORDER — NITROGLYCERIN 0.4 MG/1
0.4 TABLET SUBLINGUAL EVERY 5 MIN PRN
COMMUNITY

## 2022-11-30 RX ORDER — ATORVASTATIN CALCIUM 20 MG/1
20 TABLET, FILM COATED ORAL DAILY
COMMUNITY

## 2022-11-30 RX ORDER — HEPARIN SODIUM 1000 [USP'U]/ML
INJECTION, SOLUTION INTRAVENOUS; SUBCUTANEOUS
Status: DISCONTINUED | OUTPATIENT
Start: 2022-11-30 | End: 2022-11-30 | Stop reason: HOSPADM

## 2022-11-30 RX ORDER — VERAPAMIL HYDROCHLORIDE 2.5 MG/ML
INJECTION, SOLUTION INTRAVENOUS
Status: DISCONTINUED | OUTPATIENT
Start: 2022-11-30 | End: 2022-11-30 | Stop reason: HOSPADM

## 2022-11-30 RX ORDER — MONTELUKAST SODIUM 10 MG/1
10 TABLET ORAL NIGHTLY
Status: ON HOLD | COMMUNITY
End: 2022-11-30

## 2022-11-30 RX ORDER — RANOLAZINE 500 MG/1
500 TABLET, EXTENDED RELEASE ORAL 2 TIMES DAILY
Status: ON HOLD | COMMUNITY
End: 2022-11-30

## 2022-11-30 RX ORDER — SODIUM CHLORIDE 0.9 % (FLUSH) 0.9 %
10 SYRINGE (ML) INJECTION
Status: ACTIVE | OUTPATIENT
Start: 2022-11-30

## 2022-11-30 RX ORDER — ACETAMINOPHEN 325 MG/1
650 TABLET ORAL EVERY 4 HOURS PRN
Status: DISCONTINUED | OUTPATIENT
Start: 2022-11-30 | End: 2022-11-30 | Stop reason: HOSPADM

## 2022-11-30 RX ORDER — DIAZEPAM 5 MG/1
10 TABLET ORAL
Status: DISCONTINUED | OUTPATIENT
Start: 2022-11-30 | End: 2022-11-30 | Stop reason: HOSPADM

## 2022-11-30 RX ADMIN — DIAZEPAM 10 MG: 5 TABLET ORAL at 09:11

## 2022-11-30 RX ADMIN — SODIUM CHLORIDE: 9 INJECTION, SOLUTION INTRAVENOUS at 09:11

## 2022-11-30 RX ADMIN — DIPHENHYDRAMINE HYDROCHLORIDE 50 MG: 25 CAPSULE ORAL at 09:11

## 2022-11-30 NOTE — DISCHARGE INSTRUCTIONS
Go to ER if unusual symptoms occur. Remove dressing & armboard in 24 hours (after 12 pm) & may also shower at this time. Once you remove armboard, do not flex wrist excessively. Do not rub site, pat dry. No heavy lifting (greater than 5-10 pounds) x 5 days. If site starts bleeding, hold pressure & call 911, do not attempt to drive. No driving x 48 hours.

## 2022-11-30 NOTE — Clinical Note
The catheter was inserted into the ostium   left main. Hemodynamics were performed.  An angiography was performed of the left coronary arteries. Multiple views were taken. The angiography was performed via power injection.

## 2022-11-30 NOTE — DISCHARGE SUMMARY
[facility] - Cath Lab Services  Discharge Note  Short Stay    Procedure(s) (LRB):  CATHETERIZATION, HEART, LEFT (Left)      OUTCOME: Patient tolerated treatment/procedure well without complication and is now ready for discharge.    DISPOSITION: Home or Self Care    FINAL DIAGNOSIS:  normal coronaries    FOLLOWUP: In clinic    DISCHARGE INSTRUCTIONS:  No discharge procedures on file.     TIME SPENT ON DISCHARGE:   30 minutes

## 2022-11-30 NOTE — Clinical Note
The right brachial was prepped. The site was prepped with ChloraPrep. The site was clipped. The patient was draped. The patient was positioned supine.

## 2022-11-30 NOTE — Clinical Note
The catheter was repositioned into the ostium   right coronary artery. Hemodynamics were performed.  An angiography was performed of the right coronary arteries. Multiple views were taken. The angiography was performed via power injection.  Then removed.

## 2023-06-18 LAB
APPEARANCE UR: CLEAR
BACTERIA #/AREA URNS AUTO: ABNORMAL /HPF
BILIRUB UR QL STRIP.AUTO: NEGATIVE MG/DL
COLOR UR: ABNORMAL
GLUCOSE UR QL STRIP.AUTO: NORMAL MG/DL
HYALINE CASTS #/AREA URNS LPF: ABNORMAL /LPF
KETONES UR QL STRIP.AUTO: NEGATIVE MG/DL
LEUKOCYTE ESTERASE UR QL STRIP.AUTO: NEGATIVE UNIT/L
MUCOUS THREADS URNS QL MICRO: ABNORMAL /LPF
NITRITE UR QL STRIP.AUTO: NEGATIVE
PH UR STRIP.AUTO: 6.5 [PH]
PROT UR QL STRIP.AUTO: NEGATIVE MG/DL
RBC #/AREA URNS AUTO: ABNORMAL /HPF
RBC UR QL AUTO: NEGATIVE UNIT/L
SP GR UR STRIP.AUTO: 1.02
SPERM URNS QL MICRO: ABNORMAL /HPF
SQUAMOUS #/AREA URNS LPF: ABNORMAL /HPF
UROBILINOGEN UR STRIP-ACNC: NORMAL MG/DL
WBC #/AREA URNS AUTO: ABNORMAL /HPF

## 2023-06-18 PROCEDURE — 81001 URINALYSIS AUTO W/SCOPE: CPT | Performed by: PHYSICIAN ASSISTANT

## 2023-06-18 PROCEDURE — 99284 EMERGENCY DEPT VISIT MOD MDM: CPT

## 2023-06-19 ENCOUNTER — HOSPITAL ENCOUNTER (EMERGENCY)
Facility: HOSPITAL | Age: 63
Discharge: HOME | End: 2023-06-19
Attending: STUDENT IN AN ORGANIZED HEALTH CARE EDUCATION/TRAINING PROGRAM
Payer: MEDICAID

## 2023-06-19 VITALS
OXYGEN SATURATION: 97 % | WEIGHT: 162.94 LBS | HEIGHT: 68 IN | RESPIRATION RATE: 20 BRPM | DIASTOLIC BLOOD PRESSURE: 81 MMHG | BODY MASS INDEX: 24.7 KG/M2 | TEMPERATURE: 98 F | SYSTOLIC BLOOD PRESSURE: 123 MMHG | HEART RATE: 78 BPM

## 2023-06-19 DIAGNOSIS — K64.9 BLEEDING HEMORRHOIDS: Primary | ICD-10-CM

## 2023-06-19 LAB
ALBUMIN SERPL-MCNC: 3.9 G/DL (ref 3.4–4.8)
ALBUMIN/GLOB SERPL: 1 RATIO (ref 1.1–2)
ALP SERPL-CCNC: 66 UNIT/L (ref 40–150)
ALT SERPL-CCNC: 12 UNIT/L (ref 0–55)
APTT PPP: 29.4 SECONDS
AST SERPL-CCNC: 24 UNIT/L (ref 5–34)
BASOPHILS # BLD AUTO: 0.05 X10(3)/MCL
BASOPHILS NFR BLD AUTO: 0.4 %
BILIRUBIN DIRECT+TOT PNL SERPL-MCNC: 0.3 MG/DL
BUN SERPL-MCNC: 15.8 MG/DL (ref 8.4–25.7)
CALCIUM SERPL-MCNC: 9.8 MG/DL (ref 8.8–10)
CHLORIDE SERPL-SCNC: 103 MMOL/L (ref 98–107)
CO2 SERPL-SCNC: 25 MMOL/L (ref 23–31)
CREAT SERPL-MCNC: 1.09 MG/DL (ref 0.73–1.18)
EOSINOPHIL # BLD AUTO: 0.18 X10(3)/MCL (ref 0–0.9)
EOSINOPHIL NFR BLD AUTO: 1.6 %
ERYTHROCYTE [DISTWIDTH] IN BLOOD BY AUTOMATED COUNT: 13.1 % (ref 11.5–17)
FLUAV AG UPPER RESP QL IA.RAPID: NOT DETECTED
FLUBV AG UPPER RESP QL IA.RAPID: NOT DETECTED
GFR SERPLBLD CREATININE-BSD FMLA CKD-EPI: >60 MLS/MIN/1.73/M2
GLOBULIN SER-MCNC: 3.9 GM/DL (ref 2.4–3.5)
GLUCOSE SERPL-MCNC: 118 MG/DL (ref 82–115)
HCT VFR BLD AUTO: 42.8 % (ref 42–52)
HGB BLD-MCNC: 14.3 G/DL (ref 14–18)
IMM GRANULOCYTES # BLD AUTO: 0.05 X10(3)/MCL (ref 0–0.04)
IMM GRANULOCYTES NFR BLD AUTO: 0.4 %
LYMPHOCYTES # BLD AUTO: 1.98 X10(3)/MCL (ref 0.6–4.6)
LYMPHOCYTES NFR BLD AUTO: 17.2 %
MCH RBC QN AUTO: 30.5 PG (ref 27–31)
MCHC RBC AUTO-ENTMCNC: 33.4 G/DL (ref 33–36)
MCV RBC AUTO: 91.3 FL (ref 80–94)
MONOCYTES # BLD AUTO: 0.85 X10(3)/MCL (ref 0.1–1.3)
MONOCYTES NFR BLD AUTO: 7.4 %
NEUTROPHILS # BLD AUTO: 8.42 X10(3)/MCL (ref 2.1–9.2)
NEUTROPHILS NFR BLD AUTO: 73 %
NRBC BLD AUTO-RTO: 0 %
PLATELET # BLD AUTO: 252 X10(3)/MCL (ref 130–400)
PMV BLD AUTO: 9.5 FL (ref 7.4–10.4)
POTASSIUM SERPL-SCNC: 4.4 MMOL/L (ref 3.5–5.1)
PROT SERPL-MCNC: 7.8 GM/DL (ref 5.8–7.6)
RBC # BLD AUTO: 4.69 X10(6)/MCL (ref 4.7–6.1)
SARS-COV-2 RNA RESP QL NAA+PROBE: NOT DETECTED
SODIUM SERPL-SCNC: 138 MMOL/L (ref 136–145)
WBC # SPEC AUTO: 11.53 X10(3)/MCL (ref 4.5–11.5)

## 2023-06-19 PROCEDURE — 0240U COVID/FLU A&B PCR: CPT | Performed by: PHYSICIAN ASSISTANT

## 2023-06-19 PROCEDURE — 96372 THER/PROPH/DIAG INJ SC/IM: CPT | Performed by: PHYSICIAN ASSISTANT

## 2023-06-19 PROCEDURE — 63600175 PHARM REV CODE 636 W HCPCS: Performed by: PHYSICIAN ASSISTANT

## 2023-06-19 RX ORDER — DOCUSATE SODIUM 100 MG/1
100 CAPSULE, LIQUID FILLED ORAL 2 TIMES DAILY PRN
Qty: 30 CAPSULE | Refills: 0 | Status: SHIPPED | OUTPATIENT
Start: 2023-06-19

## 2023-06-19 RX ORDER — FLUTICASONE PROPIONATE 50 MCG
1 SPRAY, SUSPENSION (ML) NASAL 2 TIMES DAILY PRN
Qty: 15 G | Refills: 0 | Status: SHIPPED | OUTPATIENT
Start: 2023-06-19

## 2023-06-19 RX ADMIN — METHYLNALTREXONE BROMIDE 12 MG: 12 INJECTION, SOLUTION SUBCUTANEOUS at 01:06

## 2023-06-19 NOTE — DISCHARGE INSTRUCTIONS
Report to Emergency Department if symptoms return or worsen; [facility] - Medicine Clinic Within 1 to 2 days, It is important that you follow up with your primary care provider or specialist if indicated for further evaluation, workup, and treatment as necessary. The exam and treatment you received in Emergency Department was for an urgent problem and NOT INTENDED AS COMPLETE CARE. It is important that you FOLLOW UP with a doctor for ongoing care. If your symptoms become WORSE or you DO NOT IMPROVE and you are unable to reach your health care provider, you should RETURN to the Emergency Department. The Emergency Department provider has provided a PRELIMINARY INTERPRETATION of all your studies. A final interpretation may be done after you are discharged. If a change in your diagnosis or treatment is needed WE WILL CONTACT YOU. It is critical that we have a CURRENT PHONE NUMBER FOR YOU.

## 2023-06-19 NOTE — ED PROVIDER NOTES
"Encounter Date: 6/18/2023       History     Chief Complaint   Patient presents with    Rectal Bleeding     Pt reports wiping after bm and seeing a moderate amt of bright red blood on the toilet paper and in the bowl. Pt reports this happened once before several years ago but it was a scant amt. Pt reports hard stools, does take norco for back pain with no stool softener/laxative.  No distress noted, vss. Also wanted to mention sinus headache.     Headache     64 yo M w/ PMHx significant for HTN, GERD & osseous lesions of lumbar vertebrae presents to ED c/o BRBPR when wiping after BMs today. Patient does take norco for pain & does not take stool softener. Reports frequently straining w/ BMs & states he sometimes feels "bumps" on rectum which may be hemorrhoids. Also reports recent sinus congestion, does not take anything for allergies. Denies cough, sore throat, F/C, abdominal pain, abdominal distension, gross rectal bleed, melena, N/V, anorexia, appetite changes, unintended weight loss,  symptoms. VSS on arrival, patient in NAD.    Review of patient's allergies indicates:  No Known Allergies  Past Medical History:   Diagnosis Date    GERD (gastroesophageal reflux disease)     Hypertension     Spinal cord cysts      Past Surgical History:   Procedure Laterality Date    CARDIOTOMY      CORONARY ANGIOGRAPHY N/A 11/30/2022    Procedure: ANGIOGRAM, CORONARY ARTERY;  Surgeon: Alexis Cruz MD;  Location: [facility] CATH LAB;  Service: Cardiology;  Laterality: N/A;    LEFT HEART CATHETERIZATION      [facility]    LEFT HEART CATHETERIZATION  11/30/2022    Dr. Cruz; Diagnostic Only    TONSILLECTOMY       History reviewed. No pertinent family history.  Social History     Tobacco Use    Smoking status: Never    Smokeless tobacco: Never   Substance Use Topics    Alcohol use: Yes     Comment: occasional    Drug use: Yes     Types: Marijuana     Comment: daily     Review of Systems   All other systems reviewed and are negative.    Physical " Exam     Initial Vitals [06/18/23 2249]   BP Pulse Resp Temp SpO2   135/86 67 20 98.2 °F (36.8 °C) 99 %      MAP       --         Physical Exam    Nursing note and vitals reviewed.  Constitutional: He appears well-developed and well-nourished. He is not diaphoretic. No distress.   HENT:   Head: Normocephalic and atraumatic.   Mouth/Throat: Mucous membranes are normal.   Eyes: Conjunctivae and EOM are normal. Pupils are equal, round, and reactive to light. No scleral icterus.   Neck: Neck supple.   Normal range of motion.  Cardiovascular:  Normal rate, regular rhythm, normal heart sounds and intact distal pulses.     Exam reveals no gallop and no friction rub.       No murmur heard.  Pulmonary/Chest: Breath sounds normal. No respiratory distress. He has no wheezes. He has no rhonchi. He has no rales. He exhibits no tenderness.   Abdominal: Abdomen is soft. Bowel sounds are normal. He exhibits no distension, no fluid wave, no abdominal bruit, no pulsatile midline mass and no mass. There is no abdominal tenderness.   No right CVA tenderness.  No left CVA tenderness. There is no rebound, no guarding, no tenderness at McBurney's point and negative Álvarez's sign. negative Rovsing's sign  Genitourinary: Rectum:      External hemorrhoid present.      No rectal mass or anal fissure.      Genitourinary Comments: Exam chaperoned by Giancarlo Arevalo RN     Musculoskeletal:         General: No tenderness or edema. Normal range of motion.      Cervical back: Normal range of motion and neck supple.     Neurological: He is alert and oriented to person, place, and time. No cranial nerve deficit.   Skin: Skin is warm and dry. Capillary refill takes less than 2 seconds. No rash noted. No pallor.   Psychiatric: He has a normal mood and affect.       ED Course   Procedures  Labs Reviewed   URINALYSIS - Abnormal; Notable for the following components:       Result Value    Bacteria, UA Trace (*)     Mucous, UA Trace (*)     Sperm, UA Trace  (*)     All other components within normal limits   COMPREHENSIVE METABOLIC PANEL - Abnormal; Notable for the following components:    Glucose Level 118 (*)     Protein Total 7.8 (*)     Globulin 3.9 (*)     Albumin/Globulin Ratio 1.0 (*)     All other components within normal limits   CBC WITH DIFFERENTIAL - Abnormal; Notable for the following components:    WBC 11.53 (*)     RBC 4.69 (*)     IG# 0.05 (*)     All other components within normal limits   APTT - Normal   COVID/FLU A&B PCR - Normal    Narrative:     The Xpert Xpress SARS-CoV-2/FLU/RSV plus is a rapid, multiplexed real-time PCR test intended for the simultaneous qualitative detection and differentiation of SARS-CoV-2, Influenza A, Influenza B, and respiratory syncytial virus (RSV) viral RNA in either nasopharyngeal swab or nasal swab specimens.         CBC W/ AUTO DIFFERENTIAL    Narrative:     The following orders were created for panel order CBC auto differential.  Procedure                               Abnormality         Status                     ---------                               -----------         ------                     CBC with Differential[[id number]]        Abnormal            Final result                 Please view results for these tests on the individual orders.   PROTIME-INR   EXTRA TUBES    Narrative:     The following orders were created for panel order EXTRA TUBES.  Procedure                               Abnormality         Status                     ---------                               -----------         ------                     Gold Top Hold[[id number]]                                    In process                 Pink Top Hold[[id number]]                                    In process                   Please view results for these tests on the individual orders.   GOLD TOP HOLD   PINK TOP HOLD          Imaging Results              X-Ray Abdomen Flat And Erect (Preliminary result)  Result time 06/19/23 01:11:16       Wet Read by BRIDGET Yoo (06/19/23 01:11:16, [facility], Emergency Medicine)    Significant stool burden. No signs of obstruction or abdominal free air.                                     Medications   methylnaltrexone 12 mg/0.6 mL subcutaneous injection 12 mg (12 mg Subcutaneous Given 6/19/23 0140)     Medical Decision Making:   Clinical Tests:   Lab Tests: Ordered and Reviewed  Radiological Study: Ordered and Reviewed  Today's workup largely unremarkable. XR does reveal significant stool burden, but no evidence of obstruction or abdominal free air. Benign abdominal exam w/o TTP, rebound, guarding, rigidity or distension. Normal BS in all quadrants on auscultation. Non-thrombosed external hemorrhoid appreciated on rectal exam, but otherwise unremarkable. Coags WNL. CBC & CMP unremarkable. COVID & flu swabs negative. Patient is nontoxic appearing w/ normal vitals, stable for discharge. Given dose of relistor IM prior to discharge. Sent home w/ stool softener & flonase. Discussed importance of adequate water intake. Instructed to follow-up w/ PCP later today. ED precautions given for new or worsening symptoms.                        Clinical Impression:   Final diagnoses:  [K64.9] Bleeding hemorrhoids (Primary)        ED Disposition Condition    Discharge Good          ED Prescriptions       Medication Sig Dispense Start Date End Date Auth. Provider    fluticasone propionate (FLONASE) 50 mcg/actuation nasal spray 1 spray (50 mcg total) by Each Nostril route 2 (two) times daily as needed (sinus congestion). 15 g 6/19/2023 -- BRIDGET Yoo    docusate sodium (COLACE) 100 MG capsule Take 1 capsule (100 mg total) by mouth 2 (two) times daily as needed for Constipation. 30 capsule 6/19/2023 -- BRIDGET Yoo          Follow-up Information       Follow up With Specialties Details Why Contact Info    Bib Avila III, APRN-CNP Family Medicine Call on 6/19/2023  [address]  [facility] [address]  [phone]      [facility] Emergency Medicine  As needed, If symptoms worsen [address] [facility] [address] [phone]             BRIDGET Yoo  06/19/23 6206

## 2023-08-16 LAB
INR PPP: 0.9
PROTHROMBIN TIME: 12.4 SECONDS (ref 11.4–14)

## 2024-01-17 ENCOUNTER — HOSPITAL ENCOUNTER (EMERGENCY)
Facility: HOSPITAL | Age: 64
Discharge: HOME | End: 2024-01-17
Attending: INTERNAL MEDICINE
Payer: MEDICAID

## 2024-01-17 VITALS
WEIGHT: 170.88 LBS | TEMPERATURE: 98 F | HEART RATE: 78 BPM | RESPIRATION RATE: 16 BRPM | BODY MASS INDEX: 26.82 KG/M2 | OXYGEN SATURATION: 100 % | SYSTOLIC BLOOD PRESSURE: 164 MMHG | DIASTOLIC BLOOD PRESSURE: 95 MMHG | HEIGHT: 67 IN

## 2024-01-17 DIAGNOSIS — J06.9 UPPER RESPIRATORY TRACT INFECTION, UNSPECIFIED TYPE: Primary | ICD-10-CM

## 2024-01-17 LAB
FLUAV AG UPPER RESP QL IA.RAPID: NOT DETECTED
FLUBV AG UPPER RESP QL IA.RAPID: NOT DETECTED
SARS-COV-2 RNA RESP QL NAA+PROBE: NOT DETECTED

## 2024-01-17 PROCEDURE — 0240U COVID/FLU A&B PCR: CPT | Performed by: PHYSICIAN ASSISTANT

## 2024-01-17 PROCEDURE — 99283 EMERGENCY DEPT VISIT LOW MDM: CPT

## 2024-01-17 RX ORDER — PROMETHAZINE HYDROCHLORIDE AND DEXTROMETHORPHAN HYDROBROMIDE 6.25; 15 MG/5ML; MG/5ML
5 SYRUP ORAL EVERY 6 HOURS PRN
Qty: 118 ML | Refills: 0 | Status: SHIPPED | OUTPATIENT
Start: 2024-01-17

## 2024-01-17 NOTE — ED PROVIDER NOTES
Encounter Date: 1/17/2024       History     Chief Complaint   Patient presents with    Cough     Pt c/o non-productive cough x 5 days      Patient with pmhx of HTN and GERD presents today c/o dry cough for 5 days. No exacerbating or relieving factors. He denies any other associated symptoms. Taking robitussin at home with no improvement. Wife with similar symptoms.     The history is provided by the patient. No  was used.     Review of patient's allergies indicates:  No Known Allergies  Past Medical History:   Diagnosis Date    GERD (gastroesophageal reflux disease)     Hypertension     Spinal cord cysts      Past Surgical History:   Procedure Laterality Date    CARDIOTOMY      CORONARY ANGIOGRAPHY N/A 11/30/2022    Procedure: ANGIOGRAM, CORONARY ARTERY;  Surgeon: Alexis Cruz MD;  Location: [facility] CATH LAB;  Service: Cardiology;  Laterality: N/A;    LEFT HEART CATHETERIZATION      [facility]    LEFT HEART CATHETERIZATION  11/30/2022    Dr. Cruz; Diagnostic Only    TONSILLECTOMY       No family history on file.  Social History     Tobacco Use    Smoking status: Never    Smokeless tobacco: Never   Substance Use Topics    Alcohol use: Yes     Comment: occasional    Drug use: Yes     Types: Marijuana     Comment: daily     Review of Systems   Constitutional:  Negative for chills and fever.   HENT:  Negative for congestion, postnasal drip, rhinorrhea, sinus pressure, sinus pain and sore throat.    Respiratory:  Positive for cough. Negative for chest tightness, shortness of breath, wheezing and stridor.    Cardiovascular:  Negative for chest pain.   Gastrointestinal:  Negative for abdominal pain, constipation, diarrhea, nausea and vomiting.   Genitourinary:  Negative for dysuria, flank pain and hematuria.   Musculoskeletal:  Negative for arthralgias and myalgias.   Skin:  Negative for rash.   Allergic/Immunologic: Negative for immunocompromised state.   Neurological:  Negative for syncope, light-headedness  and headaches.   All other systems reviewed and are negative.      Physical Exam     Initial Vitals [01/17/24 0811]   BP Pulse Resp Temp SpO2   (!) 164/95 78 16 98.3 °F (36.8 °C) 100 %      MAP       --         Physical Exam    Nursing note and vitals reviewed.  Constitutional: He appears well-developed and well-nourished. He is not diaphoretic. No distress.   HENT:   Head: Normocephalic and atraumatic.   Mouth/Throat: Oropharynx is clear and moist. No oropharyngeal exudate.   Eyes: Conjunctivae and EOM are normal.   Neck: Neck supple.   Normal range of motion.  Cardiovascular:  Normal rate, regular rhythm, normal heart sounds and intact distal pulses.           Pulmonary/Chest: Breath sounds normal. No respiratory distress. He has no wheezes. He has no rhonchi. He has no rales.   Abdominal: Abdomen is soft. He exhibits no distension. There is no abdominal tenderness.   Musculoskeletal:         General: No edema.      Cervical back: Normal range of motion and neck supple.     Neurological: He is alert and oriented to person, place, and time. GCS score is 15. GCS eye subscore is 4. GCS verbal subscore is 5. GCS motor subscore is 6.   Skin: Skin is warm and dry. Capillary refill takes less than 2 seconds. No rash noted.   Psychiatric: He has a normal mood and affect.         ED Course   Procedures  Labs Reviewed   COVID/FLU A&B PCR - Normal    Narrative:     The Xpert Xpress SARS-CoV-2/FLU/RSV plus is a rapid, multiplexed real-time PCR test intended for the simultaneous qualitative detection and differentiation of SARS-CoV-2, Influenza A, Influenza B, and respiratory syncytial virus (RSV) viral RNA in either nasopharyngeal swab or nasal swab specimens.                Imaging Results    None          Medications - No data to display  Medical Decision Making  Patient presents today with dry cough for  5 days    Ddx: common cold, covid-19, influenza, bronchitis, pneumonia, GERD, amongst others    Patient is non-toxic  appearing. Vitals stable. Labs reviewed. Recommend supportive measures at home. Advised to f/u with pcp. Stable for discharge. ED precautions given.     Amount and/or Complexity of Data Reviewed  Labs: ordered. Decision-making details documented in ED Course.    Risk  Prescription drug management.               ED Course as of 01/17/24 0924 Wed Jan 17, 2024 0923 Influenza A, Molecular: Not Detected [SA]   0923 Influenza B, Molecular: Not Detected [SA]   0923 SARS-CoV2 (COVID-19) Qualitative PCR: Not Detected [SA]      ED Course User Index  [SA] Becki Damon PA                   Clinical Impression:  Final diagnoses:  [J06.9] Upper respiratory tract infection, unspecified type (Primary)          ED Disposition Condition    Discharge Stable          ED Prescriptions       Medication Sig Dispense Start Date End Date Auth. Provider    promethazine-dextromethorphan (PROMETHAZINE-DM) 6.25-15 mg/5 mL Syrp Take 5 mLs by mouth every 6 (six) hours as needed (cough). 118 mL 1/17/2024 -- Becki Damon PA          Follow-up Information       Follow up With Specialties Details Why Contact Info    [facility] Emergency Medicine  If symptoms worsen return to ED immediately [address] [facility] [address] [phone]    Bib Avila III, BRIE-CNP Family Medicine In 2 days  [address] [facility] [address]  [phone]               Becki Damon PA  01/17/24 0971

## 2024-01-20 ENCOUNTER — HOSPITAL ENCOUNTER (EMERGENCY)
Facility: HOSPITAL | Age: 64
Discharge: HOME | End: 2024-01-20
Attending: INTERNAL MEDICINE
Payer: MEDICAID

## 2024-01-20 VITALS
DIASTOLIC BLOOD PRESSURE: 99 MMHG | BODY MASS INDEX: 26.1 KG/M2 | WEIGHT: 172.19 LBS | HEART RATE: 64 BPM | OXYGEN SATURATION: 98 % | HEIGHT: 68 IN | RESPIRATION RATE: 23 BRPM | TEMPERATURE: 98 F | SYSTOLIC BLOOD PRESSURE: 158 MMHG

## 2024-01-20 DIAGNOSIS — R07.9 CHEST PAIN: ICD-10-CM

## 2024-01-20 DIAGNOSIS — R07.89 CHEST WALL PAIN: Primary | ICD-10-CM

## 2024-01-20 LAB
ALBUMIN SERPL-MCNC: 4 G/DL (ref 3.4–4.8)
ALBUMIN/GLOB SERPL: 1.1 RATIO (ref 1.1–2)
ALP SERPL-CCNC: 74 UNIT/L (ref 40–150)
ALT SERPL-CCNC: 12 UNIT/L (ref 0–55)
AST SERPL-CCNC: 20 UNIT/L (ref 5–34)
BASOPHILS # BLD AUTO: 0.03 X10(3)/MCL
BASOPHILS NFR BLD AUTO: 0.3 %
BILIRUB SERPL-MCNC: 0.5 MG/DL
BNP BLD-MCNC: 19 PG/ML
BUN SERPL-MCNC: 8 MG/DL (ref 8.4–25.7)
CALCIUM SERPL-MCNC: 9.8 MG/DL (ref 8.8–10)
CHLORIDE SERPL-SCNC: 106 MMOL/L (ref 98–107)
CO2 SERPL-SCNC: 26 MMOL/L (ref 23–31)
CREAT SERPL-MCNC: 0.9 MG/DL (ref 0.73–1.18)
EOSINOPHIL # BLD AUTO: 0.18 X10(3)/MCL (ref 0–0.9)
EOSINOPHIL NFR BLD AUTO: 1.8 %
ERYTHROCYTE [DISTWIDTH] IN BLOOD BY AUTOMATED COUNT: 13.1 % (ref 11.5–17)
GFR SERPLBLD CREATININE-BSD FMLA CKD-EPI: >60 MLS/MIN/1.73/M2
GLOBULIN SER-MCNC: 3.7 GM/DL (ref 2.4–3.5)
GLUCOSE SERPL-MCNC: 121 MG/DL (ref 82–115)
HCT VFR BLD AUTO: 44.7 % (ref 42–52)
HGB BLD-MCNC: 15.1 G/DL (ref 14–18)
HOLD SPECIMEN: NORMAL
IMM GRANULOCYTES # BLD AUTO: 0.03 X10(3)/MCL (ref 0–0.04)
IMM GRANULOCYTES NFR BLD AUTO: 0.3 %
LYMPHOCYTES # BLD AUTO: 2.17 X10(3)/MCL (ref 0.6–4.6)
LYMPHOCYTES NFR BLD AUTO: 21.8 %
MCH RBC QN AUTO: 30.6 PG (ref 27–31)
MCHC RBC AUTO-ENTMCNC: 33.8 G/DL (ref 33–36)
MCV RBC AUTO: 90.5 FL (ref 80–94)
MONOCYTES # BLD AUTO: 0.56 X10(3)/MCL (ref 0.1–1.3)
MONOCYTES NFR BLD AUTO: 5.6 %
NEUTROPHILS # BLD AUTO: 6.98 X10(3)/MCL (ref 2.1–9.2)
NEUTROPHILS NFR BLD AUTO: 70.2 %
NRBC BLD AUTO-RTO: 0 %
PLATELET # BLD AUTO: 210 X10(3)/MCL (ref 130–400)
PMV BLD AUTO: 9.4 FL (ref 7.4–10.4)
POTASSIUM SERPL-SCNC: 4.8 MMOL/L (ref 3.5–5.1)
PROT SERPL-MCNC: 7.7 GM/DL (ref 5.8–7.6)
RBC # BLD AUTO: 4.94 X10(6)/MCL (ref 4.7–6.1)
SODIUM SERPL-SCNC: 142 MMOL/L (ref 136–145)
TROPONIN I SERPL-MCNC: <0.01 NG/ML (ref 0–0.04)
WBC # SPEC AUTO: 9.95 X10(3)/MCL (ref 4.5–11.5)

## 2024-01-20 PROCEDURE — 96372 THER/PROPH/DIAG INJ SC/IM: CPT | Performed by: INTERNAL MEDICINE

## 2024-01-20 PROCEDURE — 85025 COMPLETE CBC W/AUTO DIFF WBC: CPT | Performed by: INTERNAL MEDICINE

## 2024-01-20 PROCEDURE — 80053 COMPREHEN METABOLIC PANEL: CPT | Performed by: INTERNAL MEDICINE

## 2024-01-20 PROCEDURE — 93005 ELECTROCARDIOGRAM TRACING: CPT

## 2024-01-20 PROCEDURE — 83880 ASSAY OF NATRIURETIC PEPTIDE: CPT | Performed by: INTERNAL MEDICINE

## 2024-01-20 PROCEDURE — 84484 ASSAY OF TROPONIN QUANT: CPT | Performed by: INTERNAL MEDICINE

## 2024-01-20 PROCEDURE — 99285 EMERGENCY DEPT VISIT HI MDM: CPT | Mod: 25

## 2024-01-20 PROCEDURE — 63600175 PHARM REV CODE 636 W HCPCS: Performed by: INTERNAL MEDICINE

## 2024-01-20 RX ORDER — DICLOFENAC SODIUM 50 MG/1
50 TABLET, DELAYED RELEASE ORAL 2 TIMES DAILY PRN
Qty: 20 TABLET | Refills: 0 | Status: SHIPPED | OUTPATIENT
Start: 2024-01-20

## 2024-01-20 RX ORDER — KETOROLAC TROMETHAMINE 30 MG/ML
30 INJECTION, SOLUTION INTRAMUSCULAR; INTRAVENOUS
Status: COMPLETED | OUTPATIENT
Start: 2024-01-20 | End: 2024-01-20

## 2024-01-20 RX ADMIN — KETOROLAC TROMETHAMINE 30 MG: 30 INJECTION, SOLUTION INTRAMUSCULAR; INTRAVENOUS at 04:01

## 2024-01-20 NOTE — ED PROVIDER NOTES
Encounter Date: 1/20/2024       History     Chief Complaint   Patient presents with    Chest Pain     Pt c/o chest pain since yesterday.  Describes as squeezing pain that gets worse with movement.  Hx of open heart surgery for cyst removal 4 years ago. Denies n/v/     Presents with left upper chest pain since yesterday. States pain is achy, mild to moderate, radiating to his back shoulder, worse with movement. Denies shortness of breath, diaphoresis, vomiting or injury, no rash. Hx of open heart surgery to remove a cyst, denies CAD    The history is provided by the patient.     Review of patient's allergies indicates:  No Known Allergies  Past Medical History:   Diagnosis Date    GERD (gastroesophageal reflux disease)     Hypertension     Spinal cord cysts      Past Surgical History:   Procedure Laterality Date    CARDIOTOMY      CORONARY ANGIOGRAPHY N/A 11/30/2022    Procedure: ANGIOGRAM, CORONARY ARTERY;  Surgeon: Alexis Cruz MD;  Location: [facility] CATH LAB;  Service: Cardiology;  Laterality: N/A;    LEFT HEART CATHETERIZATION      [facility]    LEFT HEART CATHETERIZATION  11/30/2022    Dr. Cruz; Diagnostic Only    TONSILLECTOMY       No family history on file.  Social History     Tobacco Use    Smoking status: Never    Smokeless tobacco: Never   Substance Use Topics    Alcohol use: Yes     Comment: occasional    Drug use: Yes     Types: Marijuana     Comment: daily     Review of Systems   Constitutional:  Negative for fever.   HENT:  Negative for sore throat.    Respiratory:  Negative for shortness of breath.    Cardiovascular:  Positive for chest pain.   Gastrointestinal:  Negative for nausea.   Genitourinary:  Negative for dysuria.   Musculoskeletal:  Positive for arthralgias. Negative for back pain.   Skin:  Negative for rash.   Neurological:  Negative for weakness.   Hematological:  Does not bruise/bleed easily.   All other systems reviewed and are negative.      Physical Exam     Initial Vitals [01/20/24 1453]   BP  Pulse Resp Temp SpO2   (!) 162/99 69 16 98.4 °F (36.9 °C) 99 %      MAP       --         Physical Exam    Nursing note and vitals reviewed.  Constitutional: He appears well-developed and well-nourished. No distress.   HENT:   Head: Normocephalic and atraumatic.   Mouth/Throat: Oropharynx is clear and moist.   Eyes: Conjunctivae are normal. Pupils are equal, round, and reactive to light.   Neck: Neck supple.   Normal range of motion.  Cardiovascular:  Normal rate, regular rhythm, normal heart sounds and intact distal pulses.           Pulmonary/Chest: Breath sounds normal. No respiratory distress.   Pain reproducible by left shoulder movements   Abdominal: Abdomen is soft. Bowel sounds are normal. He exhibits no distension. There is no abdominal tenderness. There is no rebound and no guarding.   Musculoskeletal:         General: No edema. Normal range of motion.      Cervical back: Normal range of motion and neck supple.     Neurological: He is alert and oriented to person, place, and time. He has normal strength.   Skin: Skin is warm and dry. No rash noted.   Psychiatric: His behavior is normal.         ED Course   Procedures  Labs Reviewed   COMPREHENSIVE METABOLIC PANEL - Abnormal; Notable for the following components:       Result Value    Glucose Level 121 (*)     Blood Urea Nitrogen 8.0 (*)     Protein Total 7.7 (*)     Globulin 3.7 (*)     All other components within normal limits   TROPONIN I - Normal   B-TYPE NATRIURETIC PEPTIDE - Normal   CBC W/ AUTO DIFFERENTIAL    Narrative:     The following orders were created for panel order CBC auto differential.  Procedure                               Abnormality         Status                     ---------                               -----------         ------                     CBC with Differential[[id number]]                           Final result                 Please view results for these tests on the individual orders.   CBC WITH DIFFERENTIAL   EXTRA  TUBES    Narrative:     The following orders were created for panel order EXTRA TUBES.  Procedure                               Abnormality         Status                     ---------                               -----------         ------                     Light Blue Top Hold[[id number]]                             In process                 Red Top Hold[[id number]]                                    In process                 Lavender Top Hold[[id number]]                               In process                 Gold Top Hold[[id number]]                                   In process                 Pink Top Hold[[id number]]                                   In process                   Please view results for these tests on the individual orders.   LIGHT BLUE TOP HOLD   RED TOP HOLD   LAVENDER TOP HOLD   GOLD TOP HOLD   PINK TOP HOLD     EKG Readings: (Independently Interpreted)   Initial Reading: No STEMI. Rhythm: Normal Sinus Rhythm. Heart Rate: 64. Ectopy: No Ectopy. Conduction: RBBB. ST Segments: Normal ST Segments. T Waves: Normal. Axis: Left Axis Deviation. Clinical Impression: Normal Sinus Rhythm with RBBB     ECG Results              EKG 12-lead (In process)  Result time 01/20/24 15:16:59      In process by Interface, Lab In [facility] (01/20/24 15:16:59)                   Narrative:    Test Reason : R07.9,    Vent. Rate : 064 BPM     Atrial Rate : 064 BPM     P-R Int : 168 ms          QRS Dur : 136 ms      QT Int : 454 ms       P-R-T Axes : 049 -55 031 degrees     QTc Int : 468 ms    Normal sinus rhythm  Right bundle branch block  Left anterior fascicular block   Bifascicular block   Minimal voltage criteria for LVH, may be normal variant  Abnormal ECG  No previous ECGs available    Referred By: [email or web address]   SELF           Confirmed By:                                   Imaging Results              X-Ray Chest AP Portable (Final result)  Result time 01/20/24 15:55:37      Final result by Ryan Huerta MD  (01/20/24 15:55:37)                   Impression:      NO ACUTE CARDIOPULMONARY PROCESS IDENTIFIED.      Electronically signed by: Ryan Huerta  Date:    01/20/2024  Time:    15:55               Narrative:    EXAMINATION:  XR CHEST AP PORTABLE    CLINICAL HISTORY:  Chest Pain;    TECHNIQUE:  One view    COMPARISON:  October 28, 2022.    FINDINGS:  Cardiopericardial silhouette is within normal limits.  Sternotomy changes.  Lungs are without dense focal or segmental consolidation, congestive process, pleural effusions or pneumothorax.                                       Medications   ketorolac injection 30 mg (has no administration in time range)     Medical Decision Making  Amount and/or Complexity of Data Reviewed  External Data Reviewed: notes.     Details: The procedure log was documented by Documenter: Shar Justice and verified by Alexis Cruz MD.     Right radial artery was accessed.  TIG 4 catheter engaged in the left main and right coronary artery and coronary angiography was performed     Catheter was taken out at the end of the procedure     TR band applied    Finding   Normal coronaries     Summary   Coronary angiography revealed normal coronaries.  Continue medical therapy     Date: 11/30/2022  Time: 10:07 AM    Labs: ordered. Decision-making details documented in ED Course.  Radiology: ordered and independent interpretation performed. Decision-making details documented in ED Course.  ECG/medicine tests: ordered and independent interpretation performed. Decision-making details documented in ED Course.      Additional MDM:   Differential Diagnosis:   Miocardial infarction, pneumothorax, aortic dissection, pulmonary emboli, pneumonia, among others                                      Clinical Impression:  Final diagnoses:  [R07.9] Chest pain  [R07.89] Chest wall pain (Primary)          ED Disposition Condition    Discharge Stable          ED Prescriptions       Medication Sig Dispense Start Date End Date  Auth. Provider    diclofenac (VOLTAREN) 50 MG EC tablet Take 1 tablet (50 mg total) by mouth 2 (two) times daily as needed (Pain). 20 tablet 1/20/2024 -- Juan Daniel Sullivan MD          Follow-up Information       Follow up With Specialties Details Why Contact Info    Bib Avila III, APRN-CNP Family Medicine In 2 weeks  [address] [facility] [address] [phone]      [facility] Emergency Medicine  If symptoms worsen [address] [facility] [address] [phone]             Juan Daniel Sullivan MD  01/20/24 2544

## 2024-02-17 ENCOUNTER — HOSPITAL ENCOUNTER (EMERGENCY)
Facility: HOSPITAL | Age: 64
Discharge: HOME | End: 2024-02-17
Attending: EMERGENCY MEDICINE
Payer: MEDICAID

## 2024-02-17 VITALS
WEIGHT: 172.81 LBS | DIASTOLIC BLOOD PRESSURE: 94 MMHG | TEMPERATURE: 97 F | BODY MASS INDEX: 26.19 KG/M2 | RESPIRATION RATE: 18 BRPM | OXYGEN SATURATION: 99 % | SYSTOLIC BLOOD PRESSURE: 160 MMHG | HEIGHT: 68 IN | HEART RATE: 68 BPM

## 2024-02-17 DIAGNOSIS — R42 DIZZINESS: ICD-10-CM

## 2024-02-17 DIAGNOSIS — R11.2 NAUSEA & VOMITING: ICD-10-CM

## 2024-02-17 LAB
ALBUMIN SERPL-MCNC: 4.2 G/DL (ref 3.4–4.8)
ALBUMIN/GLOB SERPL: 1.3 RATIO (ref 1.1–2)
ALP SERPL-CCNC: 75 UNIT/L (ref 40–150)
ALT SERPL-CCNC: 13 UNIT/L (ref 0–55)
APPEARANCE UR: CLEAR
AST SERPL-CCNC: 21 UNIT/L (ref 5–34)
BACTERIA #/AREA URNS AUTO: ABNORMAL /HPF
BASOPHILS # BLD AUTO: 0.04 X10(3)/MCL
BASOPHILS NFR BLD AUTO: 0.4 %
BILIRUB SERPL-MCNC: 0.3 MG/DL
BILIRUB UR QL STRIP.AUTO: NEGATIVE
BUN SERPL-MCNC: 8 MG/DL (ref 8.4–25.7)
CALCIUM SERPL-MCNC: 9.6 MG/DL (ref 8.8–10)
CHLORIDE SERPL-SCNC: 109 MMOL/L (ref 98–107)
CO2 SERPL-SCNC: 26 MMOL/L (ref 23–31)
COLOR UR AUTO: ABNORMAL
CREAT SERPL-MCNC: 0.94 MG/DL (ref 0.73–1.18)
EOSINOPHIL # BLD AUTO: 0.08 X10(3)/MCL (ref 0–0.9)
EOSINOPHIL NFR BLD AUTO: 0.9 %
ERYTHROCYTE [DISTWIDTH] IN BLOOD BY AUTOMATED COUNT: 13.2 % (ref 11.5–17)
FLUAV AG UPPER RESP QL IA.RAPID: NOT DETECTED
FLUBV AG UPPER RESP QL IA.RAPID: NOT DETECTED
GFR SERPLBLD CREATININE-BSD FMLA CKD-EPI: >60 MLS/MIN/1.73/M2
GLOBULIN SER-MCNC: 3.3 GM/DL (ref 2.4–3.5)
GLUCOSE SERPL-MCNC: 185 MG/DL (ref 82–115)
GLUCOSE UR QL STRIP.AUTO: NORMAL
HCT VFR BLD AUTO: 44.3 % (ref 42–52)
HGB BLD-MCNC: 14.8 G/DL (ref 14–18)
HOLD SPECIMEN: NORMAL
HYALINE CASTS #/AREA URNS LPF: ABNORMAL /LPF
IMM GRANULOCYTES # BLD AUTO: 0.03 X10(3)/MCL (ref 0–0.04)
IMM GRANULOCYTES NFR BLD AUTO: 0.3 %
KETONES UR QL STRIP.AUTO: NEGATIVE
LEUKOCYTE ESTERASE UR QL STRIP.AUTO: NEGATIVE
LIPASE SERPL-CCNC: 22 U/L
LYMPHOCYTES # BLD AUTO: 1.3 X10(3)/MCL (ref 0.6–4.6)
LYMPHOCYTES NFR BLD AUTO: 14.1 %
MCH RBC QN AUTO: 30.6 PG (ref 27–31)
MCHC RBC AUTO-ENTMCNC: 33.4 G/DL (ref 33–36)
MCV RBC AUTO: 91.5 FL (ref 80–94)
MONOCYTES # BLD AUTO: 0.55 X10(3)/MCL (ref 0.1–1.3)
MONOCYTES NFR BLD AUTO: 6 %
MUCOUS THREADS URNS QL MICRO: ABNORMAL /LPF
NEUTROPHILS # BLD AUTO: 7.2 X10(3)/MCL (ref 2.1–9.2)
NEUTROPHILS NFR BLD AUTO: 78.3 %
NITRITE UR QL STRIP.AUTO: NEGATIVE
NRBC BLD AUTO-RTO: 0 %
PH UR STRIP.AUTO: 6 [PH]
PLATELET # BLD AUTO: 199 X10(3)/MCL (ref 130–400)
PMV BLD AUTO: 9.7 FL (ref 7.4–10.4)
POTASSIUM SERPL-SCNC: 4.6 MMOL/L (ref 3.5–5.1)
POTASSIUM SERPL-SCNC: 5.4 MMOL/L (ref 3.5–5.1)
PROT SERPL-MCNC: 7.5 GM/DL (ref 5.8–7.6)
PROT UR QL STRIP.AUTO: ABNORMAL
RBC # BLD AUTO: 4.84 X10(6)/MCL (ref 4.7–6.1)
RBC #/AREA URNS AUTO: ABNORMAL /HPF
RBC UR QL AUTO: NEGATIVE
SARS-COV-2 RNA RESP QL NAA+PROBE: NOT DETECTED
SODIUM SERPL-SCNC: 142 MMOL/L (ref 136–145)
SP GR UR STRIP.AUTO: 1.02 (ref 1–1.03)
SQUAMOUS #/AREA URNS LPF: ABNORMAL /HPF
TROPONIN I SERPL-MCNC: <0.01 NG/ML (ref 0–0.04)
UROBILINOGEN UR STRIP-ACNC: NORMAL
WBC # SPEC AUTO: 9.2 X10(3)/MCL (ref 4.5–11.5)
WBC #/AREA URNS AUTO: ABNORMAL /HPF

## 2024-02-17 PROCEDURE — 63600175 PHARM REV CODE 636 W HCPCS: Performed by: NURSE PRACTITIONER

## 2024-02-17 PROCEDURE — 84484 ASSAY OF TROPONIN QUANT: CPT | Performed by: NURSE PRACTITIONER

## 2024-02-17 PROCEDURE — 81001 URINALYSIS AUTO W/SCOPE: CPT | Performed by: NURSE PRACTITIONER

## 2024-02-17 PROCEDURE — 85025 COMPLETE CBC W/AUTO DIFF WBC: CPT | Performed by: NURSE PRACTITIONER

## 2024-02-17 PROCEDURE — 80053 COMPREHEN METABOLIC PANEL: CPT | Performed by: NURSE PRACTITIONER

## 2024-02-17 PROCEDURE — 96361 HYDRATE IV INFUSION ADD-ON: CPT

## 2024-02-17 PROCEDURE — 83690 ASSAY OF LIPASE: CPT | Performed by: NURSE PRACTITIONER

## 2024-02-17 PROCEDURE — 99285 EMERGENCY DEPT VISIT HI MDM: CPT | Mod: 25

## 2024-02-17 PROCEDURE — 84132 ASSAY OF SERUM POTASSIUM: CPT | Mod: 91 | Performed by: NURSE PRACTITIONER

## 2024-02-17 PROCEDURE — 93005 ELECTROCARDIOGRAM TRACING: CPT

## 2024-02-17 PROCEDURE — 96360 HYDRATION IV INFUSION INIT: CPT

## 2024-02-17 PROCEDURE — 0240U COVID/FLU A&B PCR: CPT | Performed by: NURSE PRACTITIONER

## 2024-02-17 PROCEDURE — 25000003 PHARM REV CODE 250: Performed by: NURSE PRACTITIONER

## 2024-02-17 RX ORDER — MECLIZINE HYDROCHLORIDE 25 MG/1
25 TABLET ORAL 3 TIMES DAILY PRN
Qty: 30 TABLET | Refills: 0 | Status: SHIPPED | OUTPATIENT
Start: 2024-02-17

## 2024-02-17 RX ORDER — ONDANSETRON 4 MG/1
4 TABLET, ORALLY DISINTEGRATING ORAL
Status: COMPLETED | OUTPATIENT
Start: 2024-02-17 | End: 2024-02-17

## 2024-02-17 RX ADMIN — ONDANSETRON 4 MG: 4 TABLET, ORALLY DISINTEGRATING ORAL at 02:02

## 2024-02-17 RX ADMIN — SODIUM CHLORIDE, POTASSIUM CHLORIDE, SODIUM LACTATE AND CALCIUM CHLORIDE 1000 ML: 600; 310; 30; 20 INJECTION, SOLUTION INTRAVENOUS at 03:02

## 2024-02-17 NOTE — ED PROVIDER NOTES
Encounter Date: 2/17/2024       History     Chief Complaint   Patient presents with    Dizziness    Vomiting     C/o dizziness, vomiting since this am.      Pt is a 63 y.o. male who presents to the [facility] ED complaining of nausea/vomiting and mild dizziness which began this AM. Hx of HTN, GERD, cardiotomy. Denies chest pain, SOB, cough, weakness, abdominal pain, or loss of bowel or bladder control. Pt does report just getting home from [place] with his family where he had gone for a vacation.      Review of patient's allergies indicates:  No Known Allergies  Past Medical History:   Diagnosis Date    GERD (gastroesophageal reflux disease)     Hypertension     Spinal cord cysts      Past Surgical History:   Procedure Laterality Date    CARDIOTOMY      CORONARY ANGIOGRAPHY N/A 11/30/2022    Procedure: ANGIOGRAM, CORONARY ARTERY;  Surgeon: Alexis Cruz MD;  Location: [facility] CATH LAB;  Service: Cardiology;  Laterality: N/A;    LEFT HEART CATHETERIZATION      [facility]    LEFT HEART CATHETERIZATION  11/30/2022    Dr. Cruz; Diagnostic Only    TONSILLECTOMY       History reviewed. No pertinent family history.  Social History     Tobacco Use    Smoking status: Never    Smokeless tobacco: Never   Substance Use Topics    Alcohol use: Yes     Comment: occasional    Drug use: Yes     Types: Marijuana     Comment: daily     Review of Systems   Constitutional:  Negative for chills, diaphoresis, fatigue and fever.   HENT:  Negative for facial swelling, postnasal drip, rhinorrhea, sinus pressure, sinus pain, sore throat and trouble swallowing.    Respiratory:  Negative for cough, chest tightness, shortness of breath and wheezing.    Cardiovascular:  Negative for chest pain, palpitations and leg swelling.   Gastrointestinal:  Positive for nausea and vomiting. Negative for abdominal pain and diarrhea.   Genitourinary:  Negative for dysuria, flank pain, hematuria and urgency.   Musculoskeletal:  Negative for arthralgias, back pain and  myalgias.   Skin:  Negative for color change and rash.   Neurological:  Positive for dizziness. Negative for syncope, weakness and headaches.   Hematological:  Does not bruise/bleed easily.   All other systems reviewed and are negative.      Physical Exam     Initial Vitals [02/17/24 1342]   BP Pulse Resp Temp SpO2   (!) 162/97 64 20 96.6 °F (35.9 °C) 100 %      MAP       --         Physical Exam    Nursing note and vitals reviewed.  Constitutional: Vital signs are normal. He appears well-developed and well-nourished.   HENT:   Head: Normocephalic.   Nose: Nose normal.   Mouth/Throat: Oropharynx is clear and moist.   Eyes: Conjunctivae and EOM are normal. Pupils are equal, round, and reactive to light.   Neck: Neck supple.   Normal range of motion.  Cardiovascular:  Normal rate, regular rhythm, normal heart sounds and intact distal pulses.           Pulmonary/Chest: Effort normal and breath sounds normal. No respiratory distress. He has no wheezes. He has no rhonchi. He has no rales. He exhibits no tenderness.   Abdominal: Abdomen is soft and flat. Bowel sounds are normal. There is no abdominal tenderness. There is no rebound, no guarding, no tenderness at McBurney's point and negative Álvarez's sign.   Musculoskeletal:         General: Normal range of motion.      Cervical back: Normal range of motion and neck supple.     Neurological: He is alert and oriented to person, place, and time. He has normal strength.   Skin: Skin is warm and dry. Capillary refill takes less than 2 seconds.   Psychiatric: He has a normal mood and affect. His behavior is normal. Judgment and thought content normal.         ED Course   Procedures  Labs Reviewed   COMPREHENSIVE METABOLIC PANEL - Abnormal; Notable for the following components:       Result Value    Potassium Level 5.4 (*)     Chloride 109 (*)     Glucose Level 185 (*)     Blood Urea Nitrogen 8.0 (*)     All other components within normal limits   URINALYSIS, REFLEX TO URINE  CULTURE - Abnormal; Notable for the following components:    Protein, UA Trace (*)     Squamous Epithelial Cells, UA Trace (*)     Mucous, UA Occ (*)     Hyaline Casts, UA 0-2 (*)     All other components within normal limits   TROPONIN I - Normal   LIPASE - Normal   COVID/FLU A&B PCR - Normal    Narrative:     The Xpert Xpress SARS-CoV-2/FLU/RSV plus is a rapid, multiplexed real-time PCR test intended for the simultaneous qualitative detection and differentiation of SARS-CoV-2, Influenza A, Influenza B, and respiratory syncytial virus (RSV) viral RNA in either nasopharyngeal swab or nasal swab specimens.         POTASSIUM - Normal   CBC W/ AUTO DIFFERENTIAL    Narrative:     The following orders were created for panel order CBC auto differential.  Procedure                               Abnormality         Status                     ---------                               -----------         ------                     CBC with Differential[[id number]]                           Final result                 Please view results for these tests on the individual orders.   CBC WITH DIFFERENTIAL   EXTRA TUBES    Narrative:     The following orders were created for panel order EXTRA TUBES.  Procedure                               Abnormality         Status                     ---------                               -----------         ------                     Light Blue Top Hold[[id number]]                             Final result               Red Top Hold[[id number]]                                    Final result               Lavender Top Hold[[id number]]                               Final result               Gold Top Hold[[id number]]                                   Final result               Pink Top Hold[[id number]]                                   Final result                 Please view results for these tests on the individual orders.   LIGHT BLUE TOP HOLD   RED TOP HOLD   LAVENDER TOP HOLD   GOLD TOP HOLD    PINK TOP HOLD          Imaging Results              CT Head Without Contrast (Final result)  Result time 02/17/24 16:26:21      Final result by Doc Dobbins MD (02/17/24 16:26:21)                   Impression:      No acute intracranial abnormality.      Electronically signed by: Doc Dobbins MD  Date:    02/17/2024  Time:    16:26               Narrative:    EXAMINATION:  CT HEAD WITHOUT CONTRAST    CLINICAL HISTORY:  Dizziness, persistent/recurrent, cardiac or vascular cause suspected;    TECHNIQUE:  Axial images of the head were obtained without IV contrast administration.  Coronal and sagittal reconstructions were provided.  Three dimensional and MIP images were obtained and evaluated.  Total DLP was 1028 mGy-cm. Dose lowering technique and automated exposure control were utilized for this exam.    COMPARISON:  None    FINDINGS:  There is normal brain formation.  There is normal gray-white matter differentiation.  There is no hemorrhage, hydrocephalus, or midline shift.  There is no cytotoxic or vasogenic edema.  There is no intra or extra-axial fluid collection.  There is no herniation.    The calvarium is intact.  The bilateral orbits are normal.  The paranasal sinuses and mastoid air cells are normally developed and free of disease.                                       X-Ray Abdomen Flat And Erect (Final result)  Result time 02/17/24 15:47:45      Final result by Doc Dobbins MD (02/17/24 15:47:45)                   Impression:      Nonobstructed bowel gas pattern.      Electronically signed by: Doc Dobbins MD  Date:    02/17/2024  Time:    15:47               Narrative:    EXAMINATION:  Two radiographic views of the ABDOMEN.    CLINICAL HISTORY:  Nausea with vomiting, unspecified    TECHNIQUE:  2 radiographic views of the ABDOMEN.    COMPARISON:  [facility] 06/18/2023.    FINDINGS:  Supine and upright views of the abdomen demonstrate a nonobstructed bowel gas pattern.  There is no pneumatosis. There is no free  air.  There is no portal venous gas.  There is no pathologic calcification.  The bilateral lung bases are clear.                                       Medications   ondansetron disintegrating tablet 4 mg (4 mg Oral Given 2/17/24 1421)   lactated ringers bolus 999 mL (1,000 mLs Intravenous New Bag 2/17/24 1535)     Medical Decision Making  Differential:  Gastroenteritis  URI  Influenza  COVID  Electrolyte imbalance      Amount and/or Complexity of Data Reviewed  Labs: ordered.  Radiology: ordered.    Risk  Prescription drug management.               ED Course as of 02/17/24 1646   Sat Feb 17, 2024   1644 Given strict ED return precautions. I have spoken with the patient and/or caregivers. I have explained the patient's condition, diagnoses and treatment plan based on the information available to me at this time. I have answered the patient's and/or caregiver's questions and addressed any concerns. The patient and/or caregivers have as good an understanding of the patient's diagnosis, condition and treatment plan as can be expected at this point. The vital signs have been stable. The patient's condition is stable and appropriate for discharge from the emergency department.      The patient will pursue further outpatient evaluation with the primary care physician or other designated or consulting physician as outlined in the discharge instructions. The patient and/or caregivers are agreeable to this plan of care and follow-up instructions have been explained in detail. The patient and/or caregivers have received these instructions in written format and have expressed an understanding of the discharge instructions. The patient and/or caregivers are aware that any significant change in condition or worsening of symptoms should prompt an immediate return to this or the closest emergency department or a call to 911.   [JA]      ED Course User Index  [JA] Shreyas Kim Jr., P                           Clinical  Impression:  Final diagnoses:  [R42] Dizziness  [R11.2] Nausea & vomiting          ED Disposition Condition    Discharge Stable          ED Prescriptions       Medication Sig Dispense Start Date End Date Auth. Provider    meclizine (ANTIVERT) 25 mg tablet Take 1 tablet (25 mg total) by mouth 3 (three) times daily as needed for Dizziness. 30 tablet 2/17/2024 -- Shreyas Kim Jr., JACOBO          Follow-up Information       Follow up With Specialties Details Why Contact Info    Bib Avila III, APRN-CNP Family Medicine In 3 days  [address] [facility] [address]  [phone]      [facility] Emergency Dept Emergency Medicine In 3 days As needed, If symptoms worsen [address] [facility] [address] [phone]             Shreyas Kim Jr., JACOBO  02/17/24 4127

## 2024-02-19 LAB
OHS QRS DURATION: 146 MS
OHS QTC CALCULATION: 488 MS

## 2024-08-22 ENCOUNTER — HOSPITAL ENCOUNTER (EMERGENCY)
Facility: HOSPITAL | Age: 64
Discharge: HOME | End: 2024-08-22
Attending: STUDENT IN AN ORGANIZED HEALTH CARE EDUCATION/TRAINING PROGRAM
Payer: MEDICAID

## 2024-08-22 VITALS
TEMPERATURE: 98 F | RESPIRATION RATE: 24 BRPM | DIASTOLIC BLOOD PRESSURE: 94 MMHG | SYSTOLIC BLOOD PRESSURE: 168 MMHG | BODY MASS INDEX: 25.7 KG/M2 | WEIGHT: 169.56 LBS | HEIGHT: 68 IN | OXYGEN SATURATION: 99 % | HEART RATE: 50 BPM

## 2024-08-22 DIAGNOSIS — R42 VERTIGO: Primary | ICD-10-CM

## 2024-08-22 LAB
ALBUMIN SERPL-MCNC: 3.8 G/DL (ref 3.4–4.8)
ALBUMIN/GLOB SERPL: 1.1 RATIO (ref 1.1–2)
ALP SERPL-CCNC: 71 UNIT/L (ref 40–150)
ALT SERPL-CCNC: 10 UNIT/L (ref 0–55)
ANION GAP SERPL CALC-SCNC: 6 MEQ/L
AST SERPL-CCNC: 20 UNIT/L (ref 5–34)
BASOPHILS # BLD AUTO: 0.04 X10(3)/MCL
BASOPHILS NFR BLD AUTO: 0.6 %
BILIRUB SERPL-MCNC: 0.3 MG/DL
BUN SERPL-MCNC: 10.2 MG/DL (ref 8.4–25.7)
CALCIUM SERPL-MCNC: 9.8 MG/DL (ref 8.8–10)
CHLORIDE SERPL-SCNC: 107 MMOL/L (ref 98–107)
CO2 SERPL-SCNC: 27 MMOL/L (ref 23–31)
CREAT SERPL-MCNC: 0.96 MG/DL (ref 0.73–1.18)
CREAT/UREA NIT SERPL: 11
EOSINOPHIL # BLD AUTO: 0.14 X10(3)/MCL (ref 0–0.9)
EOSINOPHIL NFR BLD AUTO: 1.9 %
ERYTHROCYTE [DISTWIDTH] IN BLOOD BY AUTOMATED COUNT: 13.2 % (ref 11.5–17)
GFR SERPLBLD CREATININE-BSD FMLA CKD-EPI: >60 ML/MIN/1.73/M2
GLOBULIN SER-MCNC: 3.5 GM/DL (ref 2.4–3.5)
GLUCOSE SERPL-MCNC: 113 MG/DL (ref 82–115)
HCT VFR BLD AUTO: 44.2 % (ref 42–52)
HGB BLD-MCNC: 15.2 G/DL (ref 14–18)
HOLD SPECIMEN: NORMAL
IMM GRANULOCYTES # BLD AUTO: 0.01 X10(3)/MCL (ref 0–0.04)
IMM GRANULOCYTES NFR BLD AUTO: 0.1 %
LYMPHOCYTES # BLD AUTO: 1.92 X10(3)/MCL (ref 0.6–4.6)
LYMPHOCYTES NFR BLD AUTO: 26.6 %
MAGNESIUM SERPL-MCNC: 2.3 MG/DL (ref 1.6–2.6)
MCH RBC QN AUTO: 30.8 PG (ref 27–31)
MCHC RBC AUTO-ENTMCNC: 34.4 G/DL (ref 33–36)
MCV RBC AUTO: 89.7 FL (ref 80–94)
MONOCYTES # BLD AUTO: 0.57 X10(3)/MCL (ref 0.1–1.3)
MONOCYTES NFR BLD AUTO: 7.9 %
NEUTROPHILS # BLD AUTO: 4.54 X10(3)/MCL (ref 2.1–9.2)
NEUTROPHILS NFR BLD AUTO: 62.9 %
NRBC BLD AUTO-RTO: 0 %
OHS QRS DURATION: 156 MS
OHS QTC CALCULATION: 435 MS
PLATELET # BLD AUTO: 219 X10(3)/MCL (ref 130–400)
PMV BLD AUTO: 9.8 FL (ref 7.4–10.4)
POTASSIUM SERPL-SCNC: 3.9 MMOL/L (ref 3.5–5.1)
PROT SERPL-MCNC: 7.3 GM/DL (ref 5.8–7.6)
RBC # BLD AUTO: 4.93 X10(6)/MCL (ref 4.7–6.1)
SODIUM SERPL-SCNC: 140 MMOL/L (ref 136–145)
TROPONIN I SERPL-MCNC: <0.01 NG/ML (ref 0–0.04)
WBC # BLD AUTO: 7.22 X10(3)/MCL (ref 4.5–11.5)

## 2024-08-22 PROCEDURE — 84484 ASSAY OF TROPONIN QUANT: CPT | Performed by: STUDENT IN AN ORGANIZED HEALTH CARE EDUCATION/TRAINING PROGRAM

## 2024-08-22 PROCEDURE — 80053 COMPREHEN METABOLIC PANEL: CPT | Performed by: STUDENT IN AN ORGANIZED HEALTH CARE EDUCATION/TRAINING PROGRAM

## 2024-08-22 PROCEDURE — 85025 COMPLETE CBC W/AUTO DIFF WBC: CPT | Performed by: STUDENT IN AN ORGANIZED HEALTH CARE EDUCATION/TRAINING PROGRAM

## 2024-08-22 PROCEDURE — 83735 ASSAY OF MAGNESIUM: CPT | Performed by: STUDENT IN AN ORGANIZED HEALTH CARE EDUCATION/TRAINING PROGRAM

## 2024-08-22 PROCEDURE — 25000003 PHARM REV CODE 250: Performed by: STUDENT IN AN ORGANIZED HEALTH CARE EDUCATION/TRAINING PROGRAM

## 2024-08-22 PROCEDURE — 93005 ELECTROCARDIOGRAM TRACING: CPT

## 2024-08-22 PROCEDURE — 99285 EMERGENCY DEPT VISIT HI MDM: CPT | Mod: 25

## 2024-08-22 RX ORDER — MECLIZINE HYDROCHLORIDE 25 MG/1
25 TABLET ORAL 3 TIMES DAILY PRN
Qty: 20 TABLET | Refills: 0 | Status: SHIPPED | OUTPATIENT
Start: 2024-08-22

## 2024-08-22 RX ORDER — MECLIZINE HYDROCHLORIDE 25 MG/1
25 TABLET ORAL
Status: COMPLETED | OUTPATIENT
Start: 2024-08-22 | End: 2024-08-22

## 2024-08-22 RX ADMIN — MECLIZINE HYDROCHLORIDE 25 MG: 25 TABLET ORAL at 12:08

## 2024-08-22 NOTE — ED PROVIDER NOTES
Encounter Date: 2024       History     Chief Complaint   Patient presents with    Dizziness     PT W CO DIZZINESS/FATIGUE W NAUSEA X 4 DAYS. DENIES CP/SOB.  EKG OBTAINED.      Patient presents to the emergency department complaining of dizziness.  He states for the last 4 days he has had episodes where if he turns his head he feels like everything is spinning around him.  He also states that sometimes when he stands up he feels lightheaded.  He denies any focal weakness or changes in his vision.  No chest pain or shortness of breath.  Does have a history of vertigo in the past and states this feels similar.    The history is provided by the patient.     Review of patient's allergies indicates:  No Known Allergies  Past Medical History:   Diagnosis Date    GERD (gastroesophageal reflux disease)     Hypertension     Spinal cord cysts      Past Surgical History:   Procedure Laterality Date    CARDIOTOMY      CORONARY ANGIOGRAPHY N/A 2022    Procedure: ANGIOGRAM, CORONARY ARTERY;  Surgeon: Alexis Cruz MD;  Location: [facility] CATH LAB;  Service: Cardiology;  Laterality: N/A;    LEFT HEART CATHETERIZATION      [facility]    LEFT HEART CATHETERIZATION  2022    Dr. Cruz; Diagnostic Only    TONSILLECTOMY       No family history on file.  Social History     Tobacco Use    Smoking status: Former     Current packs/day: 0.00     Types: Cigarettes     Quit date: 2018     Years since quittin.6    Smokeless tobacco: Never   Substance Use Topics    Alcohol use: Yes     Comment: occasional    Drug use: Yes     Types: Marijuana     Comment: daily     Review of Systems   Constitutional:  Negative for chills and fever.   HENT:  Negative for congestion and sore throat.    Respiratory:  Negative for cough and shortness of breath.    Cardiovascular:  Negative for chest pain and palpitations.   Gastrointestinal:  Negative for abdominal pain and nausea.   Genitourinary:  Negative for dysuria and hematuria.   Musculoskeletal:   Negative for arthralgias and myalgias.   Neurological:  Positive for dizziness and light-headedness. Negative for weakness.       Physical Exam     Initial Vitals [08/22/24 1124]   BP Pulse Resp Temp SpO2   (!) 191/100 (!) 53 18 97.9 °F (36.6 °C) 97 %      MAP       --         Physical Exam    Nursing note and vitals reviewed.  Constitutional: He appears well-developed and well-nourished.   HENT:   Head: Normocephalic and atraumatic.   Eyes: Conjunctivae are normal. Pupils are equal, round, and reactive to light.   Neck: Neck supple.   Normal range of motion.  Cardiovascular:  Normal rate and regular rhythm.           Pulmonary/Chest: Breath sounds normal. No respiratory distress.   Abdominal: Abdomen is soft. There is no abdominal tenderness.   Musculoskeletal:         General: No edema. Normal range of motion.      Cervical back: Normal range of motion and neck supple.     Neurological: He is alert and oriented to person, place, and time. He has normal strength. No cranial nerve deficit or sensory deficit.   Skin: Skin is warm and dry.         ED Course   Procedures  Labs Reviewed   MAGNESIUM - Normal       Result Value    Magnesium Level 2.30     TROPONIN I - Normal    Troponin-I <0.010     CBC W/ AUTO DIFFERENTIAL    Narrative:     The following orders were created for panel order CBC auto differential.  Procedure                               Abnormality         Status                     ---------                               -----------         ------                     CBC with Differential[[id number]]                           Final result                 Please view results for these tests on the individual orders.   COMPREHENSIVE METABOLIC PANEL    Sodium 140      Potassium 3.9      Chloride 107      CO2 27      Glucose 113      Blood Urea Nitrogen 10.2      Creatinine 0.96      Calcium 9.8      Protein Total 7.3      Albumin 3.8      Globulin 3.5      Albumin/Globulin Ratio 1.1      Bilirubin Total 0.3       ALP 71      ALT 10      AST 20      eGFR >60      Anion Gap 6.0      BUN/Creatinine Ratio 11     CBC WITH DIFFERENTIAL    WBC 7.22      RBC 4.93      Hgb 15.2      Hct 44.2      MCV 89.7      MCH 30.8      MCHC 34.4      RDW 13.2      Platelet 219      MPV 9.8      Neut % 62.9      Lymph % 26.6      Mono % 7.9      Eos % 1.9      Basophil % 0.6      Lymph # 1.92      Neut # 4.54      Mono # 0.57      Eos # 0.14      Baso # 0.04      IG# 0.01      IG% 0.1      NRBC% 0.0     EXTRA TUBES    Narrative:     The following orders were created for panel order EXTRA TUBES.  Procedure                               Abnormality         Status                     ---------                               -----------         ------                     Light Blue Top Hold[[id number]]                             In process                 Lavender Top Hold[[id number]]                               In process                 Gold Top Hold[[id number]]                                   In process                 Pink Top Hold[[id number]]                                   In process                   Please view results for these tests on the individual orders.   LIGHT BLUE TOP HOLD   LAVENDER TOP HOLD   GOLD TOP HOLD   PINK TOP HOLD     EKG Readings: (Independently Interpreted)   Initial Reading: No STEMI. Rhythm: Sinus Bradycardia. Heart Rate: 52. Ectopy: No Ectopy. Conduction: RBBB and LAFB. Axis: Normal.     ECG Results              EKG 12-lead (Chest Pain) Age >30 (In process)        Collection Time Result Time QRS Duration [facility] QTC Calculation    08/22/24 11:29:39 08/22/24 11:32:29 156 435                     In process by Interface, Lab In [facility] (08/22/24 11:32:34)                   Narrative:    Test Reason : R07.9,    Vent. Rate : 052 BPM     Atrial Rate : 052 BPM     P-R Int : 202 ms          QRS Dur : 156 ms      QT Int : 468 ms       P-R-T Axes : 061 -55 -08 degrees     QTc Int : 435 ms    Sinus bradycardia  Right  bundle branch block  Left anterior fascicular block   Bifascicular block   LVH with repolarization abnormality  Abnormal ECG  When compared with ECG of 17-FEB-2024 14:42,  Inverted T waves have replaced nonspecific T wave abnormality in Inferior  leads  T wave inversion now evident in Lateral leads    Referred By:             Confirmed By:                                   Imaging Results              X-Ray Chest AP Portable (Final result)  Result time 08/22/24 12:34:00      Final result by Shreyas Floyd MD (08/22/24 12:34:00)                   Impression:      No acute findings.      Electronically signed by: Shreyas Floyd  Date:    08/22/2024  Time:    12:34               Narrative:    EXAMINATION:  XR CHEST AP PORTABLE    CLINICAL HISTORY:  Dizziness;    COMPARISON:  20 January 2024    FINDINGS:  Frontal view of the chest was obtained. Median sternotomy.  The heart is not significantly enlarged.  Grossly clear lungs.  No pneumothorax.                                       Medications   meclizine tablet 25 mg (25 mg Oral Given 8/22/24 1229)     Medical Decision Making  Hypertensive otherwise vital signs are stable.  EKGs without concerning changes.  CBC, CMP, troponin within normal limits.  Orthostatic vitals are negative.  Patients  symptoms resolved after given p.o. meclizine.  Consistent with a peripheral vertigo.  Will discharge with a prescription for meclizine, follow up with the primary care physician.    Amount and/or Complexity of Data Reviewed  Labs: ordered. Decision-making details documented in ED Course.  Radiology: ordered. Decision-making details documented in ED Course.  ECG/medicine tests: ordered and independent interpretation performed. Decision-making details documented in ED Course.      Additional MDM:   Differential Diagnosis:   Peripheral vertigo, Central Vertigo, Cerumen impaction, orthostatic dizziness, Brain Mass, cardiac Dysrhythmias, among others                                      Clinical Impression:  Final diagnoses:  [R42] Vertigo (Primary)          ED Disposition Condition    Discharge Stable          ED Prescriptions       Medication Sig Dispense Start Date End Date Auth. Provider    meclizine (ANTIVERT) 25 mg tablet Take 1 tablet (25 mg total) by mouth 3 (three) times daily as needed for Dizziness. 20 tablet 8/22/2024 -- John Morin MD          Follow-up Information       Follow up With Specialties Details Why Contact Info    [facility] Emergency Medicine Go to  If symptoms worsen [address] [facility] [address] [phone]    Bib Avila III, APRN-CNP Family Medicine Call  As needed [address] [facility] [address] [phone]               John Morin MD  08/22/24 1097

## 2025-05-14 ENCOUNTER — HOSPITAL ENCOUNTER (EMERGENCY)
Facility: HOSPITAL | Age: 65
Discharge: HOME | End: 2025-05-14
Attending: EMERGENCY MEDICINE
Payer: MEDICARE

## 2025-05-14 VITALS
SYSTOLIC BLOOD PRESSURE: 148 MMHG | RESPIRATION RATE: 16 BRPM | OXYGEN SATURATION: 98 % | HEART RATE: 75 BPM | TEMPERATURE: 98 F | DIASTOLIC BLOOD PRESSURE: 99 MMHG | WEIGHT: 173 LBS | BODY MASS INDEX: 26.3 KG/M2

## 2025-05-14 DIAGNOSIS — S39.012A LUMBAR STRAIN, INITIAL ENCOUNTER: Primary | ICD-10-CM

## 2025-05-14 PROCEDURE — 96372 THER/PROPH/DIAG INJ SC/IM: CPT | Performed by: NURSE PRACTITIONER

## 2025-05-14 PROCEDURE — 99284 EMERGENCY DEPT VISIT MOD MDM: CPT | Mod: 25

## 2025-05-14 PROCEDURE — 63600175 PHARM REV CODE 636 W HCPCS: Performed by: NURSE PRACTITIONER

## 2025-05-14 RX ORDER — METHOCARBAMOL 100 MG/ML
300 INJECTION, SOLUTION INTRAMUSCULAR; INTRAVENOUS
Status: COMPLETED | OUTPATIENT
Start: 2025-05-14 | End: 2025-05-14

## 2025-05-14 RX ORDER — INDOMETHACIN 25 MG/1
25 CAPSULE ORAL 2 TIMES DAILY PRN
Qty: 14 CAPSULE | Refills: 0 | Status: SHIPPED | OUTPATIENT
Start: 2025-05-14 | End: 2025-05-14

## 2025-05-14 RX ORDER — BACLOFEN 10 MG/1
10 TABLET ORAL 3 TIMES DAILY PRN
Qty: 21 TABLET | Refills: 0 | Status: SHIPPED | OUTPATIENT
Start: 2025-05-14 | End: 2025-05-14

## 2025-05-14 RX ORDER — INDOMETHACIN 25 MG/1
25 CAPSULE ORAL 2 TIMES DAILY PRN
Qty: 14 CAPSULE | Refills: 0 | Status: SHIPPED | OUTPATIENT
Start: 2025-05-14

## 2025-05-14 RX ORDER — KETOROLAC TROMETHAMINE 30 MG/ML
30 INJECTION, SOLUTION INTRAMUSCULAR; INTRAVENOUS
Status: COMPLETED | OUTPATIENT
Start: 2025-05-14 | End: 2025-05-14

## 2025-05-14 RX ORDER — BACLOFEN 10 MG/1
10 TABLET ORAL 3 TIMES DAILY PRN
Qty: 21 TABLET | Refills: 0 | Status: SHIPPED | OUTPATIENT
Start: 2025-05-14

## 2025-05-14 RX ADMIN — KETOROLAC TROMETHAMINE 30 MG: 60 INJECTION, SOLUTION INTRAMUSCULAR at 01:05

## 2025-05-14 RX ADMIN — METHOCARBAMOL 300 MG: 100 INJECTION, SOLUTION INTRAMUSCULAR; INTRAVENOUS at 01:05

## 2025-05-14 NOTE — ED PROVIDER NOTES
"Encounter Date: 5/14/2025       History     Chief Complaint   Patient presents with    Back Pain     CO BACK PAIN SINCE HE WOKE UP THIS AM.  DENIES INJURY.       Patient is a 65-year-old male presents for evaluation of his lower back.  Reports awakening him back pain today.  Patient admits to chronic pain symptoms and experiences flares "every now and then." Chronic medical conditions include hypertension, GERD, and spinal cord cyst.  The patient denies chest pain, shortness breath, fever, abdominal pain, or loss of bowel or bladder control.      Review of patient's allergies indicates:  No Known Allergies  Past Medical History:   Diagnosis Date    GERD (gastroesophageal reflux disease)     Hypertension     Spinal cord cysts      Past Surgical History:   Procedure Laterality Date    CARDIOTOMY      CORONARY ANGIOGRAPHY N/A 11/30/2022    Procedure: ANGIOGRAM, CORONARY ARTERY;  Surgeon: Alexis Cruz MD;  Location: [facility] CATH LAB;  Service: Cardiology;  Laterality: N/A;    LEFT HEART CATHETERIZATION      [facility]    LEFT HEART CATHETERIZATION  11/30/2022    Dr. Cruz; Diagnostic Only    TONSILLECTOMY       No family history on file.  Social History[1]  Review of Systems   Constitutional:  Negative for chills, diaphoresis, fatigue and fever.   HENT:  Negative for facial swelling, postnasal drip, rhinorrhea, sinus pressure, sinus pain, sore throat and trouble swallowing.    Respiratory:  Negative for cough, chest tightness, shortness of breath and wheezing.    Cardiovascular:  Negative for chest pain, palpitations and leg swelling.   Gastrointestinal:  Negative for abdominal pain, diarrhea, nausea and vomiting.   Genitourinary:  Negative for dysuria, flank pain, hematuria and urgency.   Musculoskeletal:  Positive for back pain. Negative for arthralgias and myalgias.   Skin:  Negative for color change and rash.   Neurological:  Negative for dizziness, syncope, weakness and headaches.   Hematological:  Does not bruise/bleed " easily.   All other systems reviewed and are negative.      Physical Exam     Initial Vitals [05/14/25 1247]   BP Pulse Resp Temp SpO2   (!) 148/99 75 16 97.9 °F (36.6 °C) 98 %      MAP       --         Physical Exam    Nursing note and vitals reviewed.  Constitutional: Vital signs are normal. He appears well-developed and well-nourished.   HENT:   Head: Normocephalic.   Nose: Nose normal. Mouth/Throat: Oropharynx is clear and moist.   Eyes: Conjunctivae and EOM are normal. Pupils are equal, round, and reactive to light.   Neck: Neck supple.   Normal range of motion.  Cardiovascular:  Normal rate, regular rhythm, normal heart sounds and intact distal pulses.           Pulmonary/Chest: Effort normal and breath sounds normal. No respiratory distress. He has no wheezes. He has no rhonchi. He has no rales. He exhibits no tenderness.   Abdominal: Abdomen is soft and flat. Bowel sounds are normal. There is no abdominal tenderness. There is no rebound, no guarding, no tenderness at McBurney's point and negative Álvarez's sign.   Musculoskeletal:         General: Normal range of motion.      Cervical back: Normal range of motion and neck supple.      Lumbar back: Spasms and tenderness present. No swelling, edema or deformity.        Back:      Neurological: He is alert and oriented to person, place, and time. He has normal strength.   Skin: Skin is warm and dry. Capillary refill takes less than 2 seconds.   Psychiatric: He has a normal mood and affect. His behavior is normal. Judgment and thought content normal.         ED Course   Procedures  Labs Reviewed - No data to display       Imaging Results    None          Medications   ketorolac injection 30 mg (has no administration in time range)   methocarbamoL injection 300 mg (has no administration in time range)     Medical Decision Making  Differential:   Low back pain   Degenerative disease   Muscle strain    Risk  Prescription drug management.               ED Course as  of 05/14/25 1326   Wed May 14, 2025   1323 Given strict ED return precautions. I have spoken with the patient and/or caregivers. I have explained the patient's condition, diagnoses and treatment plan based on the information available to me at this time. I have answered the patient's and/or caregiver's questions and addressed any concerns. The patient and/or caregivers have as good an understanding of the patient's diagnosis, condition and treatment plan as can be expected at this point. The vital signs have been stable. The patient's condition is stable and appropriate for discharge from the emergency department.      The patient will pursue further outpatient evaluation with the primary care physician or other designated or consulting physician as outlined in the discharge instructions. The patient and/or caregivers are agreeable to this plan of care and follow-up instructions have been explained in detail. The patient and/or caregivers have received these instructions in written format and have expressed an understanding of the discharge instructions. The patient and/or caregivers are aware that any significant change in condition or worsening of symptoms should prompt an immediate return to this or the closest emergency department or a call to 911.   [JA]      ED Course User Index  [JA] Shreyas Kim Jr., [facility]                       This chart is generated using a voice recognition system. Grammatical and content areas may inadvertently be generated in error. Please contact me if you find a perceive any inappropriate information in this chart. Thank you.       Clinical Impression:  Final diagnoses:  [S39.012A] Lumbar strain, initial encounter (Primary)          ED Disposition Condition    Discharge Stable          ED Prescriptions       Medication Sig Dispense Start Date End Date Auth. Provider    indomethacin (INDOCIN) 25 MG capsule Take 1 capsule (25 mg total) by mouth 2 (two) times daily as needed (pain). 14  capsule 2025 -- Shreyas Kim Jr., JACOBO    baclofen (LIORESAL) 10 MG tablet Take 1 tablet (10 mg total) by mouth 3 (three) times daily as needed (muscle spasms). 21 tablet 2025 -- Shreyas Kim Jr., FNP          Follow-up Information       Follow up With Specialties Details Why Contact Info    Bib Avila III, APRN-CNP Family Medicine In 3 days  [address] [facility] [id number] [phone]      [facility] Emergency Medicine In 3 days As needed, If symptoms worsen [address] [facility] [address] [phone]               [1]   Social History  Tobacco Use    Smoking status: Former     Current packs/day: 0.00     Types: Cigarettes     Quit date:      Years since quittin.3    Smokeless tobacco: Never   Substance Use Topics    Alcohol use: Yes     Comment: occasional    Drug use: Yes     Types: Marijuana     Comment: daily        Shreyas Kim Jr., FNP  [phone]

## 2025-05-14 NOTE — DISCHARGE INSTRUCTIONS
Follow up with PCP in 5-7 days for additional evaluation.  Warm compresses to affected areas and soak in Epsom Salt for comfort.  Take pain medication as directed for up to 7 days.  Do not operate heavy machinery or moving vehicles within 30 minutes of taking muscle relaxers/gabapentin.

## (undated) DEVICE — CATH OPTITORQUE RADIAL 5FR

## (undated) DEVICE — TUBING HP AIRLSS ROT ADPT 30IN

## (undated) DEVICE — Device

## (undated) DEVICE — COVER PROBE US 5.5X58L NON LTX

## (undated) DEVICE — WIRE GUIDE SAFE-T-J .035 260CM

## (undated) DEVICE — ANGIOTOUCH KIT

## (undated) DEVICE — BAND TR COMP DEVICE REG 24CM

## (undated) DEVICE — DRAPE ANGIO BRACH 38X44IN

## (undated) DEVICE — PAD HEARTSTART DEFIB ADULT

## (undated) DEVICE — KIT GLIDESHEATH SLEND 6FR 10CM

## (undated) DEVICE — CANNULA ADULT NASAL 7FT